# Patient Record
Sex: MALE | Race: WHITE | URBAN - METROPOLITAN AREA
[De-identification: names, ages, dates, MRNs, and addresses within clinical notes are randomized per-mention and may not be internally consistent; named-entity substitution may affect disease eponyms.]

---

## 2021-08-23 RX ORDER — CEPHALEXIN 500 MG
1 CAPSULE ORAL
Qty: 0 | Refills: 0 | DISCHARGE
Start: 2021-08-23 | End: 2021-08-29

## 2021-09-08 ENCOUNTER — INPATIENT (INPATIENT)
Facility: HOSPITAL | Age: 56
LOS: 1 days | Discharge: HOME | DRG: 281 | End: 2021-09-10
Attending: HOSPITALIST | Admitting: FAMILY MEDICINE
Payer: COMMERCIAL

## 2021-09-08 VITALS — HEIGHT: 67 IN | WEIGHT: 199.96 LBS

## 2021-09-08 DIAGNOSIS — E66.9 OBESITY, UNSPECIFIED: ICD-10-CM

## 2021-09-08 DIAGNOSIS — I34.0 NONRHEUMATIC MITRAL (VALVE) INSUFFICIENCY: ICD-10-CM

## 2021-09-08 DIAGNOSIS — N17.9 ACUTE KIDNEY FAILURE, UNSPECIFIED: ICD-10-CM

## 2021-09-08 DIAGNOSIS — I48.91 UNSPECIFIED ATRIAL FIBRILLATION: ICD-10-CM

## 2021-09-08 DIAGNOSIS — I21.A1 MYOCARDIAL INFARCTION TYPE 2: ICD-10-CM

## 2021-09-08 DIAGNOSIS — I48.0 PAROXYSMAL ATRIAL FIBRILLATION: ICD-10-CM

## 2021-09-08 DIAGNOSIS — N18.9 CHRONIC KIDNEY DISEASE, UNSPECIFIED: ICD-10-CM

## 2021-09-08 DIAGNOSIS — I12.9 HYPERTENSIVE CHRONIC KIDNEY DISEASE WITH STAGE 1 THROUGH STAGE 4 CHRONIC KIDNEY DISEASE, OR UNSPECIFIED CHRONIC KIDNEY DISEASE: ICD-10-CM

## 2021-09-08 DIAGNOSIS — E11.22 TYPE 2 DIABETES MELLITUS WITH DIABETIC CHRONIC KIDNEY DISEASE: ICD-10-CM

## 2021-09-08 LAB
A1C WITH ESTIMATED AVERAGE GLUCOSE RESULT: 9.3 % — HIGH (ref 4–5.6)
ALBUMIN SERPL ELPH-MCNC: 3.6 G/DL — SIGNIFICANT CHANGE UP (ref 3.3–5)
ALP SERPL-CCNC: 98 U/L — SIGNIFICANT CHANGE UP (ref 40–120)
ALT FLD-CCNC: 24 U/L — SIGNIFICANT CHANGE UP (ref 12–78)
ANION GAP SERPL CALC-SCNC: 7 MMOL/L — SIGNIFICANT CHANGE UP (ref 5–17)
APTT BLD: 30.1 SEC — SIGNIFICANT CHANGE UP (ref 27.5–35.5)
AST SERPL-CCNC: 15 U/L — SIGNIFICANT CHANGE UP (ref 15–37)
BASOPHILS # BLD AUTO: 0.05 K/UL — SIGNIFICANT CHANGE UP (ref 0–0.2)
BASOPHILS NFR BLD AUTO: 0.6 % — SIGNIFICANT CHANGE UP (ref 0–2)
BILIRUB SERPL-MCNC: 0.5 MG/DL — SIGNIFICANT CHANGE UP (ref 0.2–1.2)
BUN SERPL-MCNC: 54 MG/DL — HIGH (ref 7–23)
CALCIUM SERPL-MCNC: 8.9 MG/DL — SIGNIFICANT CHANGE UP (ref 8.5–10.1)
CHLORIDE SERPL-SCNC: 107 MMOL/L — SIGNIFICANT CHANGE UP (ref 96–108)
CO2 SERPL-SCNC: 25 MMOL/L — SIGNIFICANT CHANGE UP (ref 22–31)
CREAT SERPL-MCNC: 3.35 MG/DL — HIGH (ref 0.5–1.3)
EOSINOPHIL # BLD AUTO: 0.08 K/UL — SIGNIFICANT CHANGE UP (ref 0–0.5)
EOSINOPHIL NFR BLD AUTO: 1 % — SIGNIFICANT CHANGE UP (ref 0–6)
ESTIMATED AVERAGE GLUCOSE: 220 MG/DL — HIGH (ref 68–114)
GLUCOSE SERPL-MCNC: 196 MG/DL — HIGH (ref 70–99)
HCT VFR BLD CALC: 37.1 % — LOW (ref 39–50)
HGB BLD-MCNC: 12.9 G/DL — LOW (ref 13–17)
IMM GRANULOCYTES NFR BLD AUTO: 0.2 % — SIGNIFICANT CHANGE UP (ref 0–1.5)
INR BLD: 0.93 RATIO — SIGNIFICANT CHANGE UP (ref 0.88–1.16)
LYMPHOCYTES # BLD AUTO: 2.83 K/UL — SIGNIFICANT CHANGE UP (ref 1–3.3)
LYMPHOCYTES # BLD AUTO: 34.6 % — SIGNIFICANT CHANGE UP (ref 13–44)
MAGNESIUM SERPL-MCNC: 2.4 MG/DL — SIGNIFICANT CHANGE UP (ref 1.6–2.6)
MCHC RBC-ENTMCNC: 31.1 PG — SIGNIFICANT CHANGE UP (ref 27–34)
MCHC RBC-ENTMCNC: 34.8 GM/DL — SIGNIFICANT CHANGE UP (ref 32–36)
MCV RBC AUTO: 89.4 FL — SIGNIFICANT CHANGE UP (ref 80–100)
MONOCYTES # BLD AUTO: 0.69 K/UL — SIGNIFICANT CHANGE UP (ref 0–0.9)
MONOCYTES NFR BLD AUTO: 8.4 % — SIGNIFICANT CHANGE UP (ref 2–14)
NEUTROPHILS # BLD AUTO: 4.52 K/UL — SIGNIFICANT CHANGE UP (ref 1.8–7.4)
NEUTROPHILS NFR BLD AUTO: 55.2 % — SIGNIFICANT CHANGE UP (ref 43–77)
PLATELET # BLD AUTO: 192 K/UL — SIGNIFICANT CHANGE UP (ref 150–400)
POTASSIUM SERPL-MCNC: 4.1 MMOL/L — SIGNIFICANT CHANGE UP (ref 3.5–5.3)
POTASSIUM SERPL-SCNC: 4.1 MMOL/L — SIGNIFICANT CHANGE UP (ref 3.5–5.3)
PROT SERPL-MCNC: 7.2 GM/DL — SIGNIFICANT CHANGE UP (ref 6–8.3)
PROTHROM AB SERPL-ACNC: 10.9 SEC — SIGNIFICANT CHANGE UP (ref 10.6–13.6)
RBC # BLD: 4.15 M/UL — LOW (ref 4.2–5.8)
RBC # FLD: 12.1 % — SIGNIFICANT CHANGE UP (ref 10.3–14.5)
SARS-COV-2 RNA SPEC QL NAA+PROBE: SIGNIFICANT CHANGE UP
SODIUM SERPL-SCNC: 139 MMOL/L — SIGNIFICANT CHANGE UP (ref 135–145)
TSH SERPL-MCNC: 0.99 UU/ML — SIGNIFICANT CHANGE UP (ref 0.34–4.82)
WBC # BLD: 8.19 K/UL — SIGNIFICANT CHANGE UP (ref 3.8–10.5)
WBC # FLD AUTO: 8.19 K/UL — SIGNIFICANT CHANGE UP (ref 3.8–10.5)

## 2021-09-08 PROCEDURE — 83970 ASSAY OF PARATHORMONE: CPT

## 2021-09-08 PROCEDURE — 85730 THROMBOPLASTIN TIME PARTIAL: CPT

## 2021-09-08 PROCEDURE — 82962 GLUCOSE BLOOD TEST: CPT

## 2021-09-08 PROCEDURE — 76770 US EXAM ABDO BACK WALL COMP: CPT

## 2021-09-08 PROCEDURE — 84484 ASSAY OF TROPONIN QUANT: CPT

## 2021-09-08 PROCEDURE — 71045 X-RAY EXAM CHEST 1 VIEW: CPT | Mod: 26

## 2021-09-08 PROCEDURE — 82310 ASSAY OF CALCIUM: CPT

## 2021-09-08 PROCEDURE — 82570 ASSAY OF URINE CREATININE: CPT

## 2021-09-08 PROCEDURE — 81001 URINALYSIS AUTO W/SCOPE: CPT

## 2021-09-08 PROCEDURE — 99223 1ST HOSP IP/OBS HIGH 75: CPT

## 2021-09-08 PROCEDURE — 86769 SARS-COV-2 COVID-19 ANTIBODY: CPT

## 2021-09-08 PROCEDURE — 99291 CRITICAL CARE FIRST HOUR: CPT

## 2021-09-08 PROCEDURE — 83036 HEMOGLOBIN GLYCOSYLATED A1C: CPT

## 2021-09-08 PROCEDURE — 85027 COMPLETE CBC AUTOMATED: CPT

## 2021-09-08 PROCEDURE — 36415 COLL VENOUS BLD VENIPUNCTURE: CPT

## 2021-09-08 PROCEDURE — 84300 ASSAY OF URINE SODIUM: CPT

## 2021-09-08 PROCEDURE — 84156 ASSAY OF PROTEIN URINE: CPT

## 2021-09-08 PROCEDURE — 84100 ASSAY OF PHOSPHORUS: CPT

## 2021-09-08 PROCEDURE — 80053 COMPREHEN METABOLIC PANEL: CPT

## 2021-09-08 PROCEDURE — 86803 HEPATITIS C AB TEST: CPT

## 2021-09-08 PROCEDURE — 93010 ELECTROCARDIOGRAM REPORT: CPT

## 2021-09-08 PROCEDURE — 83880 ASSAY OF NATRIURETIC PEPTIDE: CPT

## 2021-09-08 PROCEDURE — 93306 TTE W/DOPPLER COMPLETE: CPT

## 2021-09-08 RX ORDER — LANOLIN ALCOHOL/MO/W.PET/CERES
3 CREAM (GRAM) TOPICAL AT BEDTIME
Refills: 0 | Status: DISCONTINUED | OUTPATIENT
Start: 2021-09-08 | End: 2021-09-10

## 2021-09-08 RX ORDER — DEXTROSE 50 % IN WATER 50 %
25 SYRINGE (ML) INTRAVENOUS ONCE
Refills: 0 | Status: DISCONTINUED | OUTPATIENT
Start: 2021-09-08 | End: 2021-09-10

## 2021-09-08 RX ORDER — HEPARIN SODIUM 5000 [USP'U]/ML
7500 INJECTION INTRAVENOUS; SUBCUTANEOUS ONCE
Refills: 0 | Status: COMPLETED | OUTPATIENT
Start: 2021-09-08 | End: 2021-09-08

## 2021-09-08 RX ORDER — GLUCAGON INJECTION, SOLUTION 0.5 MG/.1ML
1 INJECTION, SOLUTION SUBCUTANEOUS ONCE
Refills: 0 | Status: DISCONTINUED | OUTPATIENT
Start: 2021-09-08 | End: 2021-09-10

## 2021-09-08 RX ORDER — INSULIN LISPRO 100/ML
VIAL (ML) SUBCUTANEOUS
Refills: 0 | Status: DISCONTINUED | OUTPATIENT
Start: 2021-09-08 | End: 2021-09-10

## 2021-09-08 RX ORDER — DILTIAZEM HCL 120 MG
20 CAPSULE, EXT RELEASE 24 HR ORAL ONCE
Refills: 0 | Status: COMPLETED | OUTPATIENT
Start: 2021-09-08 | End: 2021-09-08

## 2021-09-08 RX ORDER — DEXTROSE 50 % IN WATER 50 %
15 SYRINGE (ML) INTRAVENOUS ONCE
Refills: 0 | Status: DISCONTINUED | OUTPATIENT
Start: 2021-09-08 | End: 2021-09-10

## 2021-09-08 RX ORDER — INSULIN LISPRO 100/ML
VIAL (ML) SUBCUTANEOUS AT BEDTIME
Refills: 0 | Status: DISCONTINUED | OUTPATIENT
Start: 2021-09-08 | End: 2021-09-10

## 2021-09-08 RX ORDER — SODIUM CHLORIDE 9 MG/ML
1000 INJECTION, SOLUTION INTRAVENOUS
Refills: 0 | Status: DISCONTINUED | OUTPATIENT
Start: 2021-09-08 | End: 2021-09-10

## 2021-09-08 RX ORDER — ONDANSETRON 8 MG/1
4 TABLET, FILM COATED ORAL EVERY 8 HOURS
Refills: 0 | Status: DISCONTINUED | OUTPATIENT
Start: 2021-09-08 | End: 2021-09-10

## 2021-09-08 RX ORDER — AMLODIPINE BESYLATE 2.5 MG/1
10 TABLET ORAL DAILY
Refills: 0 | Status: DISCONTINUED | OUTPATIENT
Start: 2021-09-08 | End: 2021-09-09

## 2021-09-08 RX ORDER — HEPARIN SODIUM 5000 [USP'U]/ML
3500 INJECTION INTRAVENOUS; SUBCUTANEOUS EVERY 6 HOURS
Refills: 0 | Status: DISCONTINUED | OUTPATIENT
Start: 2021-09-08 | End: 2021-09-09

## 2021-09-08 RX ORDER — ACETAMINOPHEN 500 MG
650 TABLET ORAL EVERY 6 HOURS
Refills: 0 | Status: DISCONTINUED | OUTPATIENT
Start: 2021-09-08 | End: 2021-09-10

## 2021-09-08 RX ORDER — DILTIAZEM HCL 120 MG
30 CAPSULE, EXT RELEASE 24 HR ORAL ONCE
Refills: 0 | Status: COMPLETED | OUTPATIENT
Start: 2021-09-08 | End: 2021-09-08

## 2021-09-08 RX ORDER — HEPARIN SODIUM 5000 [USP'U]/ML
INJECTION INTRAVENOUS; SUBCUTANEOUS
Qty: 25000 | Refills: 0 | Status: DISCONTINUED | OUTPATIENT
Start: 2021-09-08 | End: 2021-09-09

## 2021-09-08 RX ORDER — LABETALOL HCL 100 MG
400 TABLET ORAL
Refills: 0 | Status: DISCONTINUED | OUTPATIENT
Start: 2021-09-08 | End: 2021-09-10

## 2021-09-08 RX ORDER — ASPIRIN/CALCIUM CARB/MAGNESIUM 324 MG
325 TABLET ORAL ONCE
Refills: 0 | Status: COMPLETED | OUTPATIENT
Start: 2021-09-08 | End: 2021-09-08

## 2021-09-08 RX ORDER — DEXTROSE 50 % IN WATER 50 %
12.5 SYRINGE (ML) INTRAVENOUS ONCE
Refills: 0 | Status: DISCONTINUED | OUTPATIENT
Start: 2021-09-08 | End: 2021-09-10

## 2021-09-08 RX ORDER — DILTIAZEM HCL 120 MG
5 CAPSULE, EXT RELEASE 24 HR ORAL
Qty: 125 | Refills: 0 | Status: DISCONTINUED | OUTPATIENT
Start: 2021-09-08 | End: 2021-09-09

## 2021-09-08 RX ORDER — HEPARIN SODIUM 5000 [USP'U]/ML
7500 INJECTION INTRAVENOUS; SUBCUTANEOUS EVERY 6 HOURS
Refills: 0 | Status: DISCONTINUED | OUTPATIENT
Start: 2021-09-08 | End: 2021-09-09

## 2021-09-08 RX ADMIN — HEPARIN SODIUM 7500 UNIT(S): 5000 INJECTION INTRAVENOUS; SUBCUTANEOUS at 18:14

## 2021-09-08 RX ADMIN — Medication 30 MILLIGRAM(S): at 15:46

## 2021-09-08 RX ADMIN — AMLODIPINE BESYLATE 10 MILLIGRAM(S): 2.5 TABLET ORAL at 22:56

## 2021-09-08 RX ADMIN — HEPARIN SODIUM 1700 UNIT(S)/HR: 5000 INJECTION INTRAVENOUS; SUBCUTANEOUS at 18:15

## 2021-09-08 RX ADMIN — Medication 325 MILLIGRAM(S): at 18:16

## 2021-09-08 RX ADMIN — Medication 5 MG/HR: at 19:55

## 2021-09-08 RX ADMIN — Medication 1: at 22:56

## 2021-09-08 RX ADMIN — Medication 400 MILLIGRAM(S): at 22:56

## 2021-09-08 RX ADMIN — Medication 20 MILLIGRAM(S): at 15:44

## 2021-09-08 NOTE — ED ADULT NURSE REASSESSMENT NOTE - NS ED NURSE REASSESS COMMENT FT1
pt resting comfortably in bed with no acute distress noted. vss. PM medication given as per md order. Urinal provided for urine. Denies chest pain,sob, dizziness, headache. Will cont to monitor for safety and comfort.

## 2021-09-08 NOTE — H&P ADULT - HISTORY OF PRESENT ILLNESS
55 y/o M PMHx significant for hypertension, and diabetes mellitus type 2, presents to  for further evaluation and management of shortness of breath and palpitations over the past 2 days. 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 The patient denies any associated chest pain. Labs => Hgb/Hct 12.9/ 37.1, BUN/Cr 54/3.35, Glu 196, TnI => 0.094 -> 0.085. In the ED the patient was given Diltiazem 20mg IVP x 1, Diltiazem 30mg PO x 1, then started on Diltiazem gtt per protocol, Heparin gtt per protocol, and ASA 325mg PO x 1.

## 2021-09-08 NOTE — ED ADULT NURSE REASSESSMENT NOTE - NS ED NURSE REASSESS COMMENT FT1
Report taken at the change of shift at bedside. Pt awake alert and oriented x4 resting comfortably in bed with no acute distress noted. Vitals stable. Plan of care updated to pt.  Denies cp,sob,ha,dz,n/v/d/fever/chills or urinary sx. dinner provided. Will cont to monitor for safety and comfort. Report taken at the change of shift at bedside. Pt awake alert and oriented x4 resting comfortably in bed with no acute distress noted. Vitals stable. Plan of care updated to pt.  Denies cp,sob,ha,dz,n/v/d/fever/chills or urinary sx. dinner provided. Heparin infusing at 17ml/hr via pump. Will cont to monitor for safety and comfort.

## 2021-09-08 NOTE — H&P ADULT - ASSESSMENT
57 y/o M PMHx significant for hypertension, and diabetes mellitus type 2, presents to  for further evaluation and management of shortness of breath and palpitations over the past 2 days. 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 The patient denies any associated chest pain. Labs => Hgb/Hct 12.9/ 37.1, BUN/Cr 54/3.35, Glu 196, TnI => 0.094 -> 0.085. In the ED the patient was given Diltiazem 20mg IVP x 1, Diltiazem 30mg PO x 1, then started on Diltiazem gtt per protocol, Heparin gtt per protocol, and ASA 325mg PO x 1.  57 y/o M PMHx significant for hypertension, and diabetes mellitus type 2, presents to  for further evaluation and management of shortness of breath and palpitations over the past 2 days. 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 The patient denies any associated chest pain. Labs => Hgb/Hct 12.9/ 37.1, BUN/Cr 54/3.35, Glu 196, TnI => 0.094 -> 0.085. In the ED the patient was given Diltiazem 20mg IVP x 1, Diltiazem 30mg PO x 1, then started on Diltiazem gtt per protocol, Heparin gtt per protocol, and ASA 325mg PO x 1.     #New Onset Atrial Fibrillation w/ RVR  ~admit to Telemetry  ~serial Faith/EKGs  ~f/u 2DECHO in the am  ~daily weights  ~strict I/Os  ~f/u w/ Cardiology in the am  ~patient started on Heparin gtt in the ED  ~NPO after MN for possible CV in the am  ~    #Hypertension       55 y/o M PMHx significant for hypertension, and diabetes mellitus type 2, presents to  for further evaluation and management of shortness of breath and palpitations over the past 2 days. 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 The patient denies any associated chest pain. Labs => Hgb/Hct 12.9/ 37.1, BUN/Cr 54/3.35, Glu 196, TnI => 0.094 -> 0.085. In the ED the patient was given Diltiazem 20mg IVP x 1, Diltiazem 30mg PO x 1, then started on Diltiazem gtt per protocol, Heparin gtt per protocol, and ASA 325mg PO x 1.     #New Onset Atrial Fibrillation w/ RVR  ~admit to Telemetry  ~serial Faith/EKGs  ~f/u 2DECHO in the am  ~daily weights  ~strict I/Os  ~f/u w/ Cardiology in the am  ~patient started on Heparin gtt in the ED  ~NPO after MN for possible CV in the am  ~cont. Diltiazem gtt    #Hypertension  ~cont. Labetalol (takes 400mg po bid)  ~cont. Amlodipine 10mg po daily    #Diabetes Mellitus   ~FS q6h while NPO  ~cont. ISS per protocol    #Vte ppx  ~cont. Heparin gtt     57 y/o M PMHx significant for hypertension, and diabetes mellitus type 2, presents to  for further evaluation and management of shortness of breath and palpitations over the past 2 days. 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 The patient denies any associated chest pain. Labs => Hgb/Hct 12.9/ 37.1, BUN/Cr 54/3.35, Glu 196, TnI => 0.094 -> 0.085. In the ED the patient was given Diltiazem 20mg IVP x 1, Diltiazem 30mg PO x 1, then started on Diltiazem gtt per protocol, Heparin gtt per protocol, and ASA 325mg PO x 1.     #New Onset Atrial Fibrillation w/ RVR  ~admit to Telemetry  ~serial Faith/EKGs  ~f/u 2DECHO in the am  ~daily weights  ~strict I/Os  ~f/u w/ Cardiology in the am  ~patient started on Heparin gtt in the ED  ~NPO after MN for possible CV in the am  ~cont. Diltiazem gtt    #Hypertension  ~cont. Labetalol (takes 400mg po bid)  ~cont. Amlodipine 10mg po daily    #Diabetes Mellitus   ~FS q6h while NPO  ~cont. ISS per protocol    #Elevated Creatinine  ~unknown baseline  ~f/u urine studies  ~f/u w/ Nephrology  ~DDx includes progression of CKD due to underlying DM vs cardiorenal?    #Vte ppx  ~cont. Heparin gtt

## 2021-09-08 NOTE — ED PROVIDER NOTE - CLINICAL SUMMARY MEDICAL DECISION MAKING FREE TEXT BOX
55 y/o male with PMHx of HTN and DM presents to ED with new onset AFib. CHADS-VASc of 2. Will obtain labs, rate control and admit.

## 2021-09-08 NOTE — ED PROVIDER NOTE - NS ED ROS FT
Constitutional: No fever or chills  Eyes: No visual changes  HEENT: No throat pain  CV: No chest pain, +palpitations, no LE swelling   Resp: +SOB  GI: No abd pain, nausea or vomiting  : No dysuria  MSK: No musculoskeletal pain  Skin: No rash  Neuro: No headache

## 2021-09-08 NOTE — ED PROVIDER NOTE - NS_ ATTENDINGSCRIBEDETAILS _ED_A_ED_FT
I, Musa Amor MD,  performed the initial face to face bedside interview with this patient regarding history of present illness, review of symptoms and relevant past medical, social and family history.  I completed an independent physical examination.  I was the initial provider who evaluated this patient.  The history, relevant review of systems, past medical and surgical history, medical decision making, and physical examination was documented by the scribe in my presence and I attest to the accuracy of the documentation.

## 2021-09-08 NOTE — ED ADULT TRIAGE NOTE - CHIEF COMPLAINT QUOTE
SOB x 2 days. Sent by PCP for afib on EKG. + palpitations. Denies chest pain, headache, nausea, fever/chills.

## 2021-09-08 NOTE — ED PROVIDER NOTE - OBJECTIVE STATEMENT
55 y/o male with PMHx HTN and DM presents to ED c/o SOB. Pt reports that he has been experiencing SOB for the last 2 days. 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 Pt was sent to PCP Dr. Ross who found him to be in A-Fib. Pt has no hx of A-Fib. Pt denies fevers, black or bloody stool. Pt denies CP and leg swelling. Pt denies recent travel or surgeries. On arrival, pt HR found to be in the 150s-160s.

## 2021-09-08 NOTE — ED PROVIDER NOTE - PHYSICAL EXAMINATION
Constitutional: NAD AAOx3  Eyes: PERRLA EOMI  Head: Normocephalic atraumatic  Mouth: MMM  Cardiac: tachycardic, no lower extremity swelling   Resp: Lungs CTAB  GI: Abd s/nt/nd  Neuro: CN2-12 intact  Skin: No rashes

## 2021-09-08 NOTE — ED PROVIDER NOTE - WR ORDER NAME 1
pt monica from urgent care, sent over for diffuse lower back pain x 3 weeks, developed hematuria 3 days ago, no fevers, no sob, no cp. back pain causes trouble with ambulation, no bowel or bladder incontinence, no recent falls or trauma. pt on plavix
Xray Chest 1 View- PORTABLE-Urgent

## 2021-09-08 NOTE — H&P ADULT - NSHPPHYSICALEXAM_GEN_ALL_CORE
Vital Signs Last 24 Hrs  T(C): 36.8 (08 Sep 2021 18:20), Max: 36.8 (08 Sep 2021 14:19)  T(F): 98.2 (08 Sep 2021 18:20), Max: 98.2 (08 Sep 2021 14:19)  HR: 121 (08 Sep 2021 18:20) (81 - 121)  BP: 141/98 (08 Sep 2021 18:20) (120/78 - 141/98)  BP(mean): 90 (08 Sep 2021 16:00) (90 - 98)  RR: 17 (08 Sep 2021 18:20) (16 - 18)  SpO2: 98% (08 Sep 2021 18:20) (98% - 99%)

## 2021-09-09 DIAGNOSIS — I10 ESSENTIAL (PRIMARY) HYPERTENSION: ICD-10-CM

## 2021-09-09 DIAGNOSIS — N18.9 CHRONIC KIDNEY DISEASE, UNSPECIFIED: ICD-10-CM

## 2021-09-09 DIAGNOSIS — I48.0 PAROXYSMAL ATRIAL FIBRILLATION: ICD-10-CM

## 2021-09-09 LAB
A1C WITH ESTIMATED AVERAGE GLUCOSE RESULT: 9.3 % — HIGH (ref 4–5.6)
ADD ON TEST-SPECIMEN IN LAB: SIGNIFICANT CHANGE UP
ALBUMIN SERPL ELPH-MCNC: 3.2 G/DL — LOW (ref 3.3–5)
ALP SERPL-CCNC: 86 U/L — SIGNIFICANT CHANGE UP (ref 40–120)
ALT FLD-CCNC: 23 U/L — SIGNIFICANT CHANGE UP (ref 12–78)
ANION GAP SERPL CALC-SCNC: 7 MMOL/L — SIGNIFICANT CHANGE UP (ref 5–17)
APPEARANCE UR: CLEAR — SIGNIFICANT CHANGE UP
APTT BLD: 100.5 SEC — HIGH (ref 27.5–35.5)
APTT BLD: > 200 SEC (ref 27.5–35.5)
AST SERPL-CCNC: 11 U/L — LOW (ref 15–37)
BILIRUB SERPL-MCNC: 0.4 MG/DL — SIGNIFICANT CHANGE UP (ref 0.2–1.2)
BILIRUB UR-MCNC: NEGATIVE — SIGNIFICANT CHANGE UP
BUN SERPL-MCNC: 53 MG/DL — HIGH (ref 7–23)
CALCIUM SERPL-MCNC: 8.5 MG/DL — SIGNIFICANT CHANGE UP (ref 8.5–10.1)
CHLORIDE SERPL-SCNC: 107 MMOL/L — SIGNIFICANT CHANGE UP (ref 96–108)
CO2 SERPL-SCNC: 25 MMOL/L — SIGNIFICANT CHANGE UP (ref 22–31)
COLOR SPEC: SIGNIFICANT CHANGE UP
CREAT ?TM UR-MCNC: 79 MG/DL — SIGNIFICANT CHANGE UP
CREAT SERPL-MCNC: 3.13 MG/DL — HIGH (ref 0.5–1.3)
DIFF PNL FLD: NEGATIVE — SIGNIFICANT CHANGE UP
ESTIMATED AVERAGE GLUCOSE: 220 MG/DL — HIGH (ref 68–114)
GLUCOSE SERPL-MCNC: 253 MG/DL — HIGH (ref 70–99)
GLUCOSE UR QL: 1000 MG/DL
HCT VFR BLD CALC: 34.5 % — LOW (ref 39–50)
HCT VFR BLD CALC: 34.8 % — LOW (ref 39–50)
HCV AB S/CO SERPL IA: 0.12 S/CO — SIGNIFICANT CHANGE UP (ref 0–0.99)
HCV AB SERPL-IMP: SIGNIFICANT CHANGE UP
HGB BLD-MCNC: 12.2 G/DL — LOW (ref 13–17)
HGB BLD-MCNC: 12.4 G/DL — LOW (ref 13–17)
KETONES UR-MCNC: NEGATIVE — SIGNIFICANT CHANGE UP
LEUKOCYTE ESTERASE UR-ACNC: NEGATIVE — SIGNIFICANT CHANGE UP
MCHC RBC-ENTMCNC: 31.4 PG — SIGNIFICANT CHANGE UP (ref 27–34)
MCHC RBC-ENTMCNC: 31.5 PG — SIGNIFICANT CHANGE UP (ref 27–34)
MCHC RBC-ENTMCNC: 35.4 GM/DL — SIGNIFICANT CHANGE UP (ref 32–36)
MCHC RBC-ENTMCNC: 35.6 GM/DL — SIGNIFICANT CHANGE UP (ref 32–36)
MCV RBC AUTO: 88.3 FL — SIGNIFICANT CHANGE UP (ref 80–100)
MCV RBC AUTO: 88.9 FL — SIGNIFICANT CHANGE UP (ref 80–100)
NITRITE UR-MCNC: NEGATIVE — SIGNIFICANT CHANGE UP
PH UR: 5 — SIGNIFICANT CHANGE UP (ref 5–8)
PLATELET # BLD AUTO: 155 K/UL — SIGNIFICANT CHANGE UP (ref 150–400)
PLATELET # BLD AUTO: 175 K/UL — SIGNIFICANT CHANGE UP (ref 150–400)
POTASSIUM SERPL-MCNC: 3.9 MMOL/L — SIGNIFICANT CHANGE UP (ref 3.5–5.3)
POTASSIUM SERPL-SCNC: 3.9 MMOL/L — SIGNIFICANT CHANGE UP (ref 3.5–5.3)
PROT ?TM UR-MCNC: 80 MG/DL — HIGH (ref 0–12)
PROT SERPL-MCNC: 6.4 GM/DL — SIGNIFICANT CHANGE UP (ref 6–8.3)
PROT UR-MCNC: 100 MG/DL
PROT/CREAT UR-RTO: 1 RATIO — HIGH (ref 0–0.2)
RBC # BLD: 3.88 M/UL — LOW (ref 4.2–5.8)
RBC # BLD: 3.94 M/UL — LOW (ref 4.2–5.8)
RBC # FLD: 11.9 % — SIGNIFICANT CHANGE UP (ref 10.3–14.5)
RBC # FLD: 12.1 % — SIGNIFICANT CHANGE UP (ref 10.3–14.5)
SODIUM SERPL-SCNC: 139 MMOL/L — SIGNIFICANT CHANGE UP (ref 135–145)
SP GR SPEC: 1.01 — SIGNIFICANT CHANGE UP (ref 1.01–1.02)
T3 SERPL-MCNC: 77 NG/DL — LOW (ref 80–200)
T4 AB SER-ACNC: 5.7 UG/DL — SIGNIFICANT CHANGE UP (ref 4.6–12)
UROBILINOGEN FLD QL: NEGATIVE MG/DL — SIGNIFICANT CHANGE UP
WBC # BLD: 5.58 K/UL — SIGNIFICANT CHANGE UP (ref 3.8–10.5)
WBC # BLD: 7.74 K/UL — SIGNIFICANT CHANGE UP (ref 3.8–10.5)
WBC # FLD AUTO: 5.58 K/UL — SIGNIFICANT CHANGE UP (ref 3.8–10.5)
WBC # FLD AUTO: 7.74 K/UL — SIGNIFICANT CHANGE UP (ref 3.8–10.5)

## 2021-09-09 PROCEDURE — 99223 1ST HOSP IP/OBS HIGH 75: CPT

## 2021-09-09 PROCEDURE — 93306 TTE W/DOPPLER COMPLETE: CPT | Mod: 26

## 2021-09-09 PROCEDURE — 99232 SBSQ HOSP IP/OBS MODERATE 35: CPT

## 2021-09-09 RX ORDER — INSULIN GLARGINE 100 [IU]/ML
10 INJECTION, SOLUTION SUBCUTANEOUS AT BEDTIME
Refills: 0 | Status: DISCONTINUED | OUTPATIENT
Start: 2021-09-09 | End: 2021-09-10

## 2021-09-09 RX ORDER — HEPARIN SODIUM 5000 [USP'U]/ML
1400 INJECTION INTRAVENOUS; SUBCUTANEOUS
Qty: 25000 | Refills: 0 | Status: DISCONTINUED | OUTPATIENT
Start: 2021-09-09 | End: 2021-09-09

## 2021-09-09 RX ORDER — DILTIAZEM HCL 120 MG
180 CAPSULE, EXT RELEASE 24 HR ORAL DAILY
Refills: 0 | Status: DISCONTINUED | OUTPATIENT
Start: 2021-09-09 | End: 2021-09-10

## 2021-09-09 RX ORDER — DILTIAZEM HCL 120 MG
30 CAPSULE, EXT RELEASE 24 HR ORAL
Refills: 0 | Status: DISCONTINUED | OUTPATIENT
Start: 2021-09-09 | End: 2021-09-09

## 2021-09-09 RX ORDER — APIXABAN 2.5 MG/1
5 TABLET, FILM COATED ORAL
Refills: 0 | Status: DISCONTINUED | OUTPATIENT
Start: 2021-09-02 | End: 2021-09-10

## 2021-09-09 RX ADMIN — Medication 3: at 12:27

## 2021-09-09 RX ADMIN — Medication 1: at 08:20

## 2021-09-09 RX ADMIN — Medication 2: at 17:37

## 2021-09-09 RX ADMIN — HEPARIN SODIUM 1400 UNIT(S)/HR: 5000 INJECTION INTRAVENOUS; SUBCUTANEOUS at 03:24

## 2021-09-09 RX ADMIN — INSULIN GLARGINE 10 UNIT(S): 100 INJECTION, SOLUTION SUBCUTANEOUS at 21:27

## 2021-09-09 RX ADMIN — Medication 400 MILLIGRAM(S): at 21:27

## 2021-09-09 RX ADMIN — Medication 180 MILLIGRAM(S): at 09:31

## 2021-09-09 RX ADMIN — APIXABAN 5 MILLIGRAM(S): 2.5 TABLET, FILM COATED ORAL at 21:27

## 2021-09-09 RX ADMIN — HEPARIN SODIUM 1200 UNIT(S)/HR: 5000 INJECTION INTRAVENOUS; SUBCUTANEOUS at 11:17

## 2021-09-09 RX ADMIN — Medication 400 MILLIGRAM(S): at 09:31

## 2021-09-09 NOTE — CONSULT NOTE ADULT - SUBJECTIVE AND OBJECTIVE BOX
57 y/o M PMHx significant for hypertension, and diabetes mellitus type 2, presents to  for further evaluation and management of shortness of breath and palpitations over the past 2 days. 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     Renal eval called for elevated creatinine - pt seen by Dr Zuniga in past for CKD but has not followed up in office > 1 year . last Creatinine was 2 's in    has not seen his PCP in > 1 year    today feeling ok    want to go home         PAST MEDICAL & SURGICAL HISTORY:  Hypertension    DM (diabetes mellitus)  Type 2    No significant past surgical history    Home Medications:  amLODIPine 10 mg oral tablet: 1 tab(s) orally once a day (08 Sep 2021 17:11)  Bydureon Pen 2 mg subcutaneous injection, extended release: 2 milligram(s) subcutaneously once a week  ***Pt hasn&#x27;t used in 1 week- ran out*** (08 Sep 2021 17:11)  cephalexin 500 mg oral capsule: 1 cap(s) orally 2 times a day  ***Course Completed*** (08 Sep 2021 17:11)  Jardiance 25 mg oral tablet: 1 tab(s) orally once a day (in the morning) (08 Sep 2021 17:11)  labetalol 200 mg oral tablet: 2 tab(s) orally 2 times a day (08 Sep 2021 17:11)  [employer] COVID-19 Vaccine PF 30 mcg/0.3 mL intramuscular suspension: 0.3 milliliter(s) intramuscular once  ***2nd dose in April*** (08 Sep 2021 17:11)      MEDICATIONS  (STANDING):  apixaban 5 milliGRAM(s) Oral two times a day  dextrose 40% Gel 15 Gram(s) Oral once  dextrose 5%. 1000 milliLiter(s) (50 mL/Hr) IV Continuous <Continuous>  dextrose 5%. 1000 milliLiter(s) (100 mL/Hr) IV Continuous <Continuous>  dextrose 50% Injectable 25 Gram(s) IV Push once  dextrose 50% Injectable 12.5 Gram(s) IV Push once  dextrose 50% Injectable 25 Gram(s) IV Push once  diltiazem    milliGRAM(s) Oral daily  glucagon  Injectable 1 milliGRAM(s) IntraMuscular once  insulin glargine Injectable (LANTUS) 10 Unit(s) SubCutaneous at bedtime  insulin lispro (ADMELOG) corrective regimen sliding scale   SubCutaneous three times a day before meals  insulin lispro (ADMELOG) corrective regimen sliding scale   SubCutaneous at bedtime  labetalol 400 milliGRAM(s) Oral two times a day      Allergies    No Known Allergies    Intolerances        SOCIAL HISTORY:  Denies ETOh,Smoking,     FAMILY HISTORY:  No pertinent family history in first degree relatives        REVIEW OF SYSTEMS:    CONSTITUTIONAL: No weakness, fevers or chills  EYES/ENT: No visual changes;  No vertigo or throat pain   NECK: No pain or stiffness  RESPIRATORY: No cough, wheezing, hemoptysis; No shortness of breath  CARDIOVASCULAR: No chest pain or palpitations  GASTROINTESTINAL: No abdominal or epigastric pain. No nausea, vomiting, or hematemesis; No diarrhea or constipation. No melena or hematochezia.  GENITOURINARY: No dysuria, frequency or hematuria  NEUROLOGICAL: No numbness or weakness  SKIN: No itching, burning, rashes, or lesions   All other review of systems is negative unless indicated above.    VITAL:  T(C): , Max: 36.7 (21 @ 00:24)  T(F): , Max: 98 (21 @ 00:24)  HR: 69 (21 @ 16:23)  BP: 138/78 (21 @ 16:23)  BP(mean): --  RR: 19 (21 @ 16:23)  SpO2: 99% (21 @ 16:23)  Wt(kg): --    I and O's:        PHYSICAL EXAM:    Constitutional: NAD  HEENT: PERRLA, EOMI,  MMM  Neck: No LAD, No JVD  Respiratory: CTAB  Cardiovascular: S1 and S2  Gastrointestinal: BS+, soft, NT/ND  Extremities: No peripheral edema  Neurological: A/O x 3, no focal deficits  : No Nieves  Skin: No rashes  Access: Not applicable    LABS:                        12.4   5.58  )-----------( 155      ( 09 Sep 2021 09:33 )             34.8       139    |  107    |  53     ----------------------------<  253       09 Sep 2021 09:35  3.9     |  25     |  3.13     139    |  107    |  54     ----------------------------<  196       08 Sep 2021 15:36  4.1     |  25     |  3.35     Ca    8.5        09 Sep 2021 09:35  Ca    8.9        08 Sep 2021 15:36    Phos  3.7       09 Sep 2021 09:35    Mg     2.4       08 Sep 2021 15:36    TPro  6.4    /  Alb  3.2    /  TBili  0.4    /        09 Sep 2021 09:35  DBili  x      /  AST  11     /  ALT  23     /  AlkPhos  86       TPro  7.2    /  Alb  3.6    /  TBili  0.5    /        08 Sep 2021 15:36  DBili  x      /  AST  15     /  ALT  24     /  AlkPhos  98         Ca    8.5      09 Sep 2021 09:35  Phos  3.7     -  Mg     2.4     -    TPro  6.4  /  Alb  3.2<L>  /  TBili  0.4  /  DBili  x   /  AST  11<L>  /  ALT  23  /  AlkPhos  86        Urine Studies:  Urinalysis Basic - ( 09 Sep 2021 17:05 )    Color: Pale Yellow / Appearance: Clear / S.015 / pH: x  Gluc: x / Ketone: Negative  / Bili: Negative / Urobili: Negative mg/dL   Blood: x / Protein: 100 mg/dL / Nitrite: Negative   Leuk Esterase: Negative / RBC: Negative /HPF / WBC Negative   Sq Epi: x / Non Sq Epi: Occasional / Bacteria: Moderate      Protein/Creatinine Ratio Calculation: 1.0 Ratio ( @ 17:05)  Creatinine, Random Urine: 79.0 mg/dL ( @ 17:05)    RADIOLOGY & ADDITIONAL STUDIES:                      
  CHIEF COMPLAINT: Patient is a 56y old  Male who presents with a chief complaint of Shortness of Breath, Palpitations (08 Sep 2021 19:24)    HPI:  55 y/o man with a history of HTN, DM, CKD, obesity, who presented to the ER for the evaluation and treatment of new onset atrial fibrillation.  He started to experience dyspnea, palpitations, and decreased exertional status on the day of presentation while at work --found to be tachycardic and in new-onset atrial fibrillation after seeing his PCP (Dr. Ross).  He denies any associated chest discomfort and has no past history of heart disease.  He was treated with IV heparin and IV diltiazem in the ED and eventually had a recurrence of sinus rhythm. His main complaint this morning is fatigue -- says he is frustrated that he has been unable to get any sleep in the past 24 hours.    There is no history of MARIELA or snoring.    PAST MEDICAL & SURGICAL HISTORY:  Hypertension  DM (diabetes mellitus) Type 2  No significant past surgical history    SOCIAL HISTORY:   Alcohol: Rare use  Smoking: Nonsmoker  Marital Status:     FAMILY HISTORY: No pertinent family history in first degree relatives    MEDICATIONS  (STANDING):  amLODIPine   Tablet 10 milliGRAM(s) Oral daily  diltiazem    Tablet 30 milliGRAM(s) Oral four times a day  diltiazem Infusion 5 mG/Hr (5 mL/Hr) IV Continuous <Continuous>  glucagon  Injectable 1 milliGRAM(s) IntraMuscular once  heparin  Infusion. 1400 Unit(s)/Hr (14 mL/Hr) IV Continuous <Continuous>  insulin lispro (ADMELOG) corrective regimen sliding scale   SubCutaneous three times a day before meals  insulin lispro (ADMELOG) corrective regimen sliding scale   SubCutaneous at bedtime  labetalol 400 milliGRAM(s) Oral two times a day    MEDICATIONS  (PRN):  acetaminophen   Tablet .. 650 milliGRAM(s) Oral every 6 hours PRN Temp greater or equal to 38.5C (101.3F), Mild Pain (1 - 3)  aluminum hydroxide/magnesium hydroxide/simethicone Suspension 30 milliLiter(s) Oral every 4 hours PRN Dyspepsia  heparin   Injectable 3500 Unit(s) IV Push every 6 hours PRN For aPTT between 40 - 57  heparin   Injectable 7500 Unit(s) IV Push every 6 hours PRN For aPTT less than 40  melatonin 3 milliGRAM(s) Oral at bedtime PRN Insomnia  ondansetron Injectable 4 milliGRAM(s) IV Push every 8 hours PRN Nausea and/or Vomiting    Allergies: No Known Allergies    REVIEW OF SYSTEMS:  CONSTITUTIONAL: + fatigue, no fevers or chills  Eyes: No visual changes  NECK: No pain or stiffness  RESPIRATORY: No cough, wheezing, hemoptysis; + shortness of breath  CARDIOVASCULAR: No chest pain, + palpitations  GASTROINTESTINAL: No abdominal pain. No nausea, vomiting, or hematemesis; No diarrhea or constipation. No melena or hematochezia.  GENITOURINARY: No dysuria, frequency or hematuria  NEUROLOGICAL: No numbness.  SKIN: No itching or rash  PSYCH: + Under stress  All other review of systems is negative unless indicated above    VITAL SIGNS:   Vital Signs Last 24 Hrs  T(C): 36 (09 Sep 2021 03:15), Max: 36.8 (08 Sep 2021 14:19)  T(F): 96.8 (09 Sep 2021 03:15), Max: 98.2 (08 Sep 2021 14:19)  HR: 69 (09 Sep 2021 06:00) (69 - 139)  BP: 98/59 (09 Sep 2021 06:00) (98/59 - 141/98)  BP(mean): 90 (08 Sep 2021 16:00) (90 - 98)  RR: 18 (09 Sep 2021 03:15) (16 - 20)  SpO2: 98% (09 Sep 2021 03:15) (98% - 99%)    PHYSICAL EXAM:  Constitutional: NAD, awake and alert  HEENT:  EOMI,  Pupils round, No oral cyanosis.  Pulmonary: Non-labored, breath sounds are clear bilaterally  Cardiovascular: S1 and S2, regular rate and rhythm  Gastrointestinal: Bowel Sounds present, soft, nontender.   Lymph: No edema. No cervical lymphadenopathy.  Neurological: Alert, no focal deficits  Skin: No rashes.  Psych:  Mood & affect appropriate, anxious    LABS:                      12.2   7.74  )-----------( 175      ( 09 Sep 2021 01:28 )             34.5              139    |  107    |  54     ----------------------------<  196    4.1     |  25     |  3.35     Ca    8.9        08 Sep 2021 15:36  Mg     2.4       08 Sep 2021 15:36    TPro  7.2    /  Alb  3.6    /  TBili  0.5    /  DBili  x      /  AST  15     /  ALT  24     /  AlkPhos  98     08 Sep 2021 15:36    PT/INR - ( 08 Sep 2021 15:36 )   PT: 10.9 sec;   INR: 0.93 ratio    PTT - ( 09 Sep 2021 01:28 )  PTT:> 200 sec    CARDIAC MARKERS ( 08 Sep 2021 18:09 ) 0.085 ng/mL / x     / x     / x     / x      CARDIAC MARKERS ( 08 Sep 2021 15:36 ) 0.094 ng/mL / x     / x     / x     / x        Pro Bnp [id number]  TSH: 0.99    Tele:  SR    Admission ECG:  AF with vent rate ~ 150, nonspecific T wave abnormality

## 2021-09-09 NOTE — CONSULT NOTE ADULT - PROBLEM SELECTOR RECOMMENDATION 9
Highly symptomatic atrial fibrillation upon presentation and with spontaneous conversion to sinus rhythm.  D/c IV diltiazem (start oral route of administration); change IV heparin to Eliquis (CHADS-VASC = 2) after echo (would like to ensure LV function is normal).  Echo today.  Outpatient stress test if normal LVEF.

## 2021-09-09 NOTE — CONSULT NOTE ADULT - PROBLEM SELECTOR RECOMMENDATION 2
Mr Colunga has a history of HTN and was taking amlodipine and labetalol prior to admission; change amlodipine for cardizem to blunt tachycardia in the event his AF recurs.

## 2021-09-09 NOTE — ED ADULT NURSE REASSESSMENT NOTE - NS ED NURSE REASSESS COMMENT FT1
heparin gtt needs to be re-ordered to reflect new drug dosing weight. covering physician dr germain notified, will address re-ordering gtt at 1400 units/hour shortly when computer available to review chart.

## 2021-09-09 NOTE — CONSULT NOTE ADULT - ASSESSMENT
57 yo male with HTN, DM and CKD Cr ~ 2 in 2020 has not followed up w Dr bourne since then. Now presenting with Afib RVR w Cr in 3's     RUDY on CKD vs CKD progression with latter more likley    check urine studies and renal sono for completeness   no ACE or ARB for now    advised pt on importance of compliance and convern for CKD progression    pt agreeable to stay for above workup    Dr Bourne to resume care of pt tomorrow     HTN    SBP 90 this AM  - avoid SBp < 120 in pt w hx of uncontrolled HTN   repeat BP now OK     ** pt seen earlier today     d/w Dr Hartman   d/w Dr Rubin

## 2021-09-10 ENCOUNTER — TRANSCRIPTION ENCOUNTER (OUTPATIENT)
Age: 56
End: 2021-09-10

## 2021-09-10 VITALS
TEMPERATURE: 97 F | SYSTOLIC BLOOD PRESSURE: 143 MMHG | RESPIRATION RATE: 18 BRPM | HEART RATE: 75 BPM | DIASTOLIC BLOOD PRESSURE: 80 MMHG | OXYGEN SATURATION: 98 %

## 2021-09-10 LAB
ALBUMIN SERPL ELPH-MCNC: 3.4 G/DL — SIGNIFICANT CHANGE UP (ref 3.3–5)
ALP SERPL-CCNC: 99 U/L — SIGNIFICANT CHANGE UP (ref 40–120)
ALT FLD-CCNC: 23 U/L — SIGNIFICANT CHANGE UP (ref 12–78)
ANION GAP SERPL CALC-SCNC: 6 MMOL/L — SIGNIFICANT CHANGE UP (ref 5–17)
AST SERPL-CCNC: 13 U/L — LOW (ref 15–37)
BILIRUB SERPL-MCNC: 0.3 MG/DL — SIGNIFICANT CHANGE UP (ref 0.2–1.2)
BUN SERPL-MCNC: 47 MG/DL — HIGH (ref 7–23)
CALCIUM SERPL-MCNC: 8.8 MG/DL — SIGNIFICANT CHANGE UP (ref 8.5–10.1)
CALCIUM SERPL-MCNC: 9 MG/DL — SIGNIFICANT CHANGE UP (ref 8.4–10.5)
CHLORIDE SERPL-SCNC: 111 MMOL/L — HIGH (ref 96–108)
CO2 SERPL-SCNC: 25 MMOL/L — SIGNIFICANT CHANGE UP (ref 22–31)
COVID-19 SPIKE DOMAIN AB INTERP: POSITIVE
COVID-19 SPIKE DOMAIN ANTIBODY RESULT: >250 U/ML — HIGH
CREAT SERPL-MCNC: 3.01 MG/DL — HIGH (ref 0.5–1.3)
GLUCOSE BLDC GLUCOMTR-MCNC: 216 MG/DL — HIGH (ref 70–99)
GLUCOSE SERPL-MCNC: 173 MG/DL — HIGH (ref 70–99)
HCT VFR BLD CALC: 36.6 % — LOW (ref 39–50)
HGB BLD-MCNC: 12.9 G/DL — LOW (ref 13–17)
MCHC RBC-ENTMCNC: 31.2 PG — SIGNIFICANT CHANGE UP (ref 27–34)
MCHC RBC-ENTMCNC: 35.2 GM/DL — SIGNIFICANT CHANGE UP (ref 32–36)
MCV RBC AUTO: 88.6 FL — SIGNIFICANT CHANGE UP (ref 80–100)
PLATELET # BLD AUTO: 171 K/UL — SIGNIFICANT CHANGE UP (ref 150–400)
POTASSIUM SERPL-MCNC: 4.3 MMOL/L — SIGNIFICANT CHANGE UP (ref 3.5–5.3)
POTASSIUM SERPL-SCNC: 4.3 MMOL/L — SIGNIFICANT CHANGE UP (ref 3.5–5.3)
PROT SERPL-MCNC: 6.8 GM/DL — SIGNIFICANT CHANGE UP (ref 6–8.3)
PTH-INTACT FLD-MCNC: 135 PG/ML — HIGH (ref 15–65)
RBC # BLD: 4.13 M/UL — LOW (ref 4.2–5.8)
RBC # FLD: 12 % — SIGNIFICANT CHANGE UP (ref 10.3–14.5)
SARS-COV-2 IGG+IGM SERPL QL IA: >250 U/ML — HIGH
SARS-COV-2 IGG+IGM SERPL QL IA: POSITIVE
SODIUM SERPL-SCNC: 142 MMOL/L — SIGNIFICANT CHANGE UP (ref 135–145)
SODIUM UR-SCNC: 24 MMOL/L — SIGNIFICANT CHANGE UP
WBC # BLD: 6.17 K/UL — SIGNIFICANT CHANGE UP (ref 3.8–10.5)
WBC # FLD AUTO: 6.17 K/UL — SIGNIFICANT CHANGE UP (ref 3.8–10.5)

## 2021-09-10 PROCEDURE — 99239 HOSP IP/OBS DSCHRG MGMT >30: CPT

## 2021-09-10 PROCEDURE — 99233 SBSQ HOSP IP/OBS HIGH 50: CPT

## 2021-09-10 PROCEDURE — 76770 US EXAM ABDO BACK WALL COMP: CPT | Mod: 26

## 2021-09-10 RX ORDER — AMLODIPINE BESYLATE 2.5 MG/1
1 TABLET ORAL
Qty: 0 | Refills: 0 | DISCHARGE

## 2021-09-10 RX ORDER — EXENATIDE 250 UG/ML
2 INJECTION SUBCUTANEOUS
Qty: 4 | Refills: 0
Start: 2021-09-10 | End: 2021-10-09

## 2021-09-10 RX ORDER — DILTIAZEM HCL 120 MG
1 CAPSULE, EXT RELEASE 24 HR ORAL
Qty: 30 | Refills: 0
Start: 2021-09-10 | End: 2021-10-09

## 2021-09-10 RX ORDER — EXENATIDE 250 UG/ML
2 INJECTION SUBCUTANEOUS
Qty: 0 | Refills: 0 | DISCHARGE

## 2021-09-10 RX ORDER — APIXABAN 2.5 MG/1
1 TABLET, FILM COATED ORAL
Qty: 60 | Refills: 0
Start: 2021-09-10 | End: 2021-10-09

## 2021-09-10 RX ADMIN — Medication 180 MILLIGRAM(S): at 09:26

## 2021-09-10 RX ADMIN — APIXABAN 5 MILLIGRAM(S): 2.5 TABLET, FILM COATED ORAL at 09:26

## 2021-09-10 RX ADMIN — Medication 2: at 12:02

## 2021-09-10 RX ADMIN — Medication 1: at 09:02

## 2021-09-10 RX ADMIN — Medication 400 MILLIGRAM(S): at 09:26

## 2021-09-10 NOTE — DISCHARGE NOTE NURSING/CASE MANAGEMENT/SOCIAL WORK - PATIENT PORTAL LINK FT
You can access the FollowMyHealth Patient Portal offered by [facility] by registering at the following website: [email or web address]. By joining [employer]’s FollowMyHealth portal, you will also be able to view your health information using other applications (apps) compatible with our system.

## 2021-09-10 NOTE — DISCHARGE NOTE PROVIDER - NSDCMRMEDTOKEN_GEN_ALL_CORE_FT
apixaban 5 mg oral tablet: 1 tab(s) orally 2 times a day  Bydureon Pen 2 mg subcutaneous injection, extended release: 2 milligram(s) subcutaneously once a week  ***Pt hasn&#x27;t used in 1 week- ran out***  dilTIAZem 180 mg/24 hours oral capsule, extended release: 1 cap(s) orally once a day  Jardiance 25 mg oral tablet: 1 tab(s) orally once a day (in the morning)  labetalol 200 mg oral tablet: 2 tab(s) orally 2 times a day  [employer] COVID-19 Vaccine PF 30 mcg/0.3 mL intramuscular suspension: 0.3 milliliter(s) intramuscular once  ***2nd dose in April***

## 2021-09-10 NOTE — DISCHARGE NOTE PROVIDER - CARE PROVIDER_API CALL
Miller Ross)  Internal Medicine  [address], [address]  [place] [address]  Phone: [phone]  Fax: [phone]  Follow Up Time: 2 weeks    Joselito Zuniga  INTERNAL MEDICINE  [address] [place] [address]  Phone: [phone]  Fax: [phone]  Follow Up Time: 2 weeks    Junior Rubin)  Cardiovascular Disease; Internal Medicine  [address], [address]  [place] [address]  Phone: [phone]  Fax: [phone]  Follow Up Time: 1 week    Ariela Ortez)  EndocrinologyMetabDiabetes; Internal Medicine  [address] [place] [address]  Phone: [phone]  Fax: [phone]  Follow Up Time: 1 week

## 2021-09-10 NOTE — PROGRESS NOTE ADULT - SUBJECTIVE AND OBJECTIVE BOX
57 y/o M PMHx significant for hypertension, and diabetes mellitus type 2, presents to  for further evaluation and management of shortness of breath and palpitations over the past 2 days. 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       Patient seen and examined. No chest pain or sob this morning. Back in sinus, no palpitations.      Review of Systems: Negative except for above      Vital Signs Last 24 Hrs  T(C): 36 (09 Sep 2021 03:15), Max: 36.8 (08 Sep 2021 18:20)  T(F): 96.8 (09 Sep 2021 03:15), Max: 98.2 (08 Sep 2021 18:20)  HR: 69 (09 Sep 2021 06:00) (69 - 139)  BP: 98/59 (09 Sep 2021 06:00) (98/59 - 141/98)  BP(mean): 90 (08 Sep 2021 16:00) (90 - 90)  RR: 18 (09 Sep 2021 03:15) (16 - 20)  SpO2: 98% (09 Sep 2021 03:15) (98% - 99%)      PHYSICAL EXAM:  Constitutional: NAD, awake and alert, well-developed  HEENT: PERR, EOMI, Normal Hearing, MMM  Neck: Soft and supple, No LAD, No JVD  Respiratory: Breath sounds are clear bilaterally, No wheezing, rales or rhonchi  Cardiovascular: S1 and S2, regular rate and rhythm, no Murmurs, gallops or rubs  Gastrointestinal: Bowel Sounds present, soft, nontender, nondistended, no guarding, no rebound  Extremities: No peripheral edema  Vascular: 2+ peripheral pulses  Neurological: A/O x 3, no focal deficits  Musculoskeletal: 5/5 strength b/l upper and lower extremities  Skin: No rashes      LABS: All Labs Reviewed:                        12.4   5.58  )-----------( 155      ( 09 Sep 2021 09:33 )             34.8     09-09    139  |  107  |  53<H>  ----------------------------<  253<H>  3.9   |  25  |  3.13<H>    Ca    8.5      09 Sep 2021 09:35  Phos  3.7     09-09  Mg     2.4     09-08    TPro  6.4  /  Alb  3.2<L>  /  TBili  0.4  /  DBili  x   /  AST  11<L>  /  ALT  23  /  AlkPhos  86  09-09    PT/INR - ( 08 Sep 2021 15:36 )   PT: 10.9 sec;   INR: 0.93 ratio         PTT - ( 09 Sep 2021 09:35 )  PTT:100.5 sec  CARDIAC MARKERS ( 08 Sep 2021 18:09 )  0.085 ng/mL / x     / x     / x     / x      CARDIAC MARKERS ( 08 Sep 2021 15:36 )  0.094 ng/mL / x     / x     / x     / x             Chest 1 View- PORTABLE-Urgent (Xray Chest 1 View- PORTABLE-Urgent .) (09.08.21 @ 16:35) >    IMPRESSION:   No radiographic evidence of active chest disease.    
Patient is a 56y Male who reports no complaints overnight. Eager to leave, reports good uop.      MEDICATIONS  (STANDING):  apixaban 5 milliGRAM(s) Oral two times a day  dextrose 40% Gel 15 Gram(s) Oral once  dextrose 5%. 1000 milliLiter(s) (50 mL/Hr) IV Continuous <Continuous>  dextrose 5%. 1000 milliLiter(s) (100 mL/Hr) IV Continuous <Continuous>  dextrose 50% Injectable 25 Gram(s) IV Push once  dextrose 50% Injectable 12.5 Gram(s) IV Push once  dextrose 50% Injectable 25 Gram(s) IV Push once  diltiazem    milliGRAM(s) Oral daily  glucagon  Injectable 1 milliGRAM(s) IntraMuscular once  insulin glargine Injectable (LANTUS) 10 Unit(s) SubCutaneous at bedtime  insulin lispro (ADMELOG) corrective regimen sliding scale   SubCutaneous three times a day before meals  insulin lispro (ADMELOG) corrective regimen sliding scale   SubCutaneous at bedtime  labetalol 400 milliGRAM(s) Oral two times a day    MEDICATIONS  (PRN):  acetaminophen   Tablet .. 650 milliGRAM(s) Oral every 6 hours PRN Temp greater or equal to 38.5C (101.3F), Mild Pain (1 - 3)  aluminum hydroxide/magnesium hydroxide/simethicone Suspension 30 milliLiter(s) Oral every 4 hours PRN Dyspepsia  melatonin 3 milliGRAM(s) Oral at bedtime PRN Insomnia  ondansetron Injectable 4 milliGRAM(s) IV Push every 8 hours PRN Nausea and/or Vomiting        T(C): , Max: 36.1 (09-10-21 @ 09:13)  T(F): , Max: 96.9 (09-10-21 @ 09:13)  HR: 75 (09-10-21 @ 09:13)  BP: 143/80 (09-10-21 @ 09:13)  BP(mean): --  RR: 18 (09-10-21 @ 09:13)  SpO2: 98% (09-10-21 @ 09:13)  Wt(kg): --        PHYSICAL EXAM:    Constitutional: NAD, obese  HEENT: PERRLA, EOMI,  MMM  Neck: No LAD, No JVD  Respiratory: CTAB  Cardiovascular: S1 and S2  Gastrointestinal: BS+, soft, NT/ND  Extremities: No peripheral edema  Neurological: A/O x 3, no focal deficits        LABS:                        12.9   6.17  )-----------( 171      ( 10 Sep 2021 08:49 )             36.6     10 Sep 2021 08:49    142    |  111    |  47     ----------------------------<  173    4.3     |  25     |  3.01   09 Sep 2021 09:35    139    |  107    |  53     ----------------------------<  253    3.9     |  25     |  3.13   08 Sep 2021 15:36    139    |  107    |  54     ----------------------------<  196    4.1     |  25     |  3.35     Ca    8.8        10 Sep 2021 08:49  Ca    8.5        09 Sep 2021 09:35  Ca    8.9        08 Sep 2021 15:36  Phos  3.3       10 Sep 2021 08:49  Phos  3.7       09 Sep 2021 09:35  Mg     2.4       08 Sep 2021 15:36    TPro  6.8    /  Alb  3.4    /  TBili  0.3    /  DBili  x      /  AST  13     /  ALT  23     /  AlkPhos  99     10 Sep 2021 08:49  TPro  6.4    /  Alb  3.2    /  TBili  0.4    /  DBili  x      /  AST  11     /  ALT  23     /  AlkPhos  86     09 Sep 2021 09:35  TPro  7.2    /  Alb  3.6    /  TBili  0.5    /  DBili  x      /  AST  15     /  ALT  24     /  AlkPhos  98     08 Sep 2021 15:36          Urine Studies:  Urinalysis Basic - ( 09 Sep 2021 17:05 )    Color: Pale Yellow / Appearance: Clear / S.015 / pH: x  Gluc: x / Ketone: Negative  / Bili: Negative / Urobili: Negative mg/dL   Blood: x / Protein: 100 mg/dL / Nitrite: Negative   Leuk Esterase: Negative / RBC: Negative /HPF / WBC Negative   Sq Epi: x / Non Sq Epi: Occasional / Bacteria: Moderate      Sodium, Random Urine: 24 mmol/L ( @ 17:05)  Protein/Creatinine Ratio Calculation: 1.0 Ratio ( @ 17:05)  Creatinine, Random Urine: 79.0 mg/dL ( @ 17:05)        RADIOLOGY & ADDITIONAL STUDIES:              
  CHIEF COMPLAINT: Patient is a 56y old  Male who presents with a chief complaint of Shortness of Breath, Palpitations (08 Sep 2021 19:24)    HPI:  55 y/o man with a history of HTN, DM, CKD, obesity, who presented to the ER for the evaluation and treatment of new onset atrial fibrillation.  He started to experience dyspnea, palpitations, and decreased exertional status on the day of presentation while at work --found to be tachycardic and in new-onset atrial fibrillation after seeing his PCP (Dr. Ross).  He denies any associated chest discomfort and has no past history of heart disease.  He was treated with IV heparin and IV diltiazem in the ED and eventually had a recurrence of sinus rhythm. His main complaint this morning is fatigue -- says he is frustrated that he has been unable to get any sleep in the past 24 hours.    There is no history of MARIELA or snoring.    9/910/21 Patient is feeling fine , remain in sinus rhythm     PAST MEDICAL & SURGICAL HISTORY:  Hypertension  DM (diabetes mellitus) Type 2  No significant past surgical history    SOCIAL HISTORY:   Alcohol: Rare use  Smoking: Nonsmoker  Marital Status:     FAMILY HISTORY: No pertinent family history in first degree relatives    MEDICATIONS  (STANDING):  amLODIPine   Tablet 10 milliGRAM(s) Oral daily  diltiazem    Tablet 30 milliGRAM(s) Oral four times a day  diltiazem Infusion 5 mG/Hr (5 mL/Hr) IV Continuous <Continuous>  glucagon  Injectable 1 milliGRAM(s) IntraMuscular once  heparin  Infusion. 1400 Unit(s)/Hr (14 mL/Hr) IV Continuous <Continuous>  insulin lispro (ADMELOG) corrective regimen sliding scale   SubCutaneous three times a day before meals  insulin lispro (ADMELOG) corrective regimen sliding scale   SubCutaneous at bedtime  labetalol 400 milliGRAM(s) Oral two times a day    MEDICATIONS  (PRN):  acetaminophen   Tablet .. 650 milliGRAM(s) Oral every 6 hours PRN Temp greater or equal to 38.5C (101.3F), Mild Pain (1 - 3)  aluminum hydroxide/magnesium hydroxide/simethicone Suspension 30 milliLiter(s) Oral every 4 hours PRN Dyspepsia  heparin   Injectable 3500 Unit(s) IV Push every 6 hours PRN For aPTT between 40 - 57  heparin   Injectable 7500 Unit(s) IV Push every 6 hours PRN For aPTT less than 40  melatonin 3 milliGRAM(s) Oral at bedtime PRN Insomnia  ondansetron Injectable 4 milliGRAM(s) IV Push every 8 hours PRN Nausea and/or Vomiting    Allergies: No Known Allergies    REVIEW OF SYSTEMS:  CONSTITUTIONAL: + fatigue, no fevers or chills  Eyes: No visual changes  NECK: No pain or stiffness  RESPIRATORY: No cough, wheezing, hemoptysis; + shortness of breath  CARDIOVASCULAR: No chest pain, + palpitations  GASTROINTESTINAL: No abdominal pain. No nausea, vomiting, or hematemesis; No diarrhea or constipation. No melena or hematochezia.  GENITOURINARY: No dysuria, frequency or hematuria  NEUROLOGICAL: No numbness.  SKIN: No itching or rash  PSYCH: + Under stress  All other review of systems is negative unless indicated above    VITAL SIGNS:   Vital Signs Last 24 Hrs  T(C): 36 (09 Sep 2021 03:15), Max: 36.8 (08 Sep 2021 14:19)  T(F): 96.8 (09 Sep 2021 03:15), Max: 98.2 (08 Sep 2021 14:19)  HR: 69 (09 Sep 2021 06:00) (69 - 139)  BP: 98/59 (09 Sep 2021 06:00) (98/59 - 141/98)  BP(mean): 90 (08 Sep 2021 16:00) (90 - 98)  RR: 18 (09 Sep 2021 03:15) (16 - 20)  SpO2: 98% (09 Sep 2021 03:15) (98% - 99%)    PHYSICAL EXAM:  Constitutional: NAD, awake and alert  HEENT:  EOMI,  Pupils round, No oral cyanosis.  Pulmonary: Non-labored, breath sounds are clear bilaterally  Cardiovascular: S1 and S2, regular rate and rhythm  Gastrointestinal: Bowel Sounds present, soft, nontender.   Lymph: No edema. No cervical lymphadenopathy.  Neurological: Alert, no focal deficits  Skin: No rashes.  Psych:  Mood & affect appropriate, anxious    LABS:                      12.2   7.74  )-----------( 175      ( 09 Sep 2021 01:28 )             34.5              139    |  107    |  54     ----------------------------<  196    4.1     |  25     |  3.35     Ca    8.9        08 Sep 2021 15:36  Mg     2.4       08 Sep 2021 15:36    TPro  7.2    /  Alb  3.6    /  TBili  0.5    /  DBili  x      /  AST  15     /  ALT  24     /  AlkPhos  98     08 Sep 2021 15:36    PT/INR - ( 08 Sep 2021 15:36 )   PT: 10.9 sec;   INR: 0.93 ratio    PTT - ( 09 Sep 2021 01:28 )  PTT:> 200 sec    CARDIAC MARKERS ( 08 Sep 2021 18:09 ) 0.085 ng/mL / x     / x     / x     / x      CARDIAC MARKERS ( 08 Sep 2021 15:36 ) 0.094 ng/mL / x     / x     / x     / x        Pro Bnp [id number]  TSH: 0.99    Tele:  SR    Admission ECG:   sinus rhythm     ECHO  Prelim  LVH Normal LVEF

## 2021-09-10 NOTE — PROGRESS NOTE ADULT - ASSESSMENT
57 y/o M PMHx significant for hypertension, and diabetes mellitus type 2, presents to  for further evaluation and management of shortness of breath and palpitations over the past 2 days. 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 Labetalol   ~cont. Amlodipine     #Diabetes Mellitus   ~ A1c 9.2  ~ add lantus 10   ~cont. ISS per protocol    # Acute on chronic RUDY   - Has nephrologist- Dr. Zuniga   - Unsure of what his last GFR/CR are   - Nephrology consult    #Vte ppx  ~cont. Heparin gtt  
55 yo male with HTN, DM and CKD Cr ~ 2 in 2020 has not followed up w Dr bourne since then. Now presenting with Afib RVR w Cr in 3's     RUDY on CKD vs CKD progression,  with latter more likley    advised pt on importance of compliance , likely CKD progression   outpatient follow up discussed  If discharged, would need labs within a week, f/u with me within 10 days     AF  -[facility]    d/c with RN, dr. Traore

## 2021-09-10 NOTE — DISCHARGE NOTE PROVIDER - CARE PROVIDERS DIRECT ADDRESSES
,DirectAddress_Unknown,[email or web address],DirectAddress_Unknown

## 2021-09-10 NOTE — PROGRESS NOTE ADULT - PROBLEM SELECTOR PLAN 1
Highly symptomatic atrial fibrillation upon presentation and with spontaneous conversion to sinus rhythm  without recurrence of afib on current medication ,  Eliquis (CHADS-VASC = 2)  echo showed hypertensive heart disease with normal LVEF   Outpatient stress test if normal LVEF.

## 2021-09-10 NOTE — DISCHARGE NOTE PROVIDER - HOSPITAL COURSE
CC: SOB  HPI and Hospital course: 57 y/o M PMHx significant for hypertension, and diabetes mellitus type 2, presents to  for further evaluation and management of shortness of breath and palpitations over the past 2 days. 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    VITALS:  T(F): 96.9 (09-10-21 @ 09:13), Max: 97.7 (09-09-21 @ 20:13)  HR: 75 (09-10-21 @ 09:13) (69 - 79)  BP: 143/80 (09-10-21 @ 09:13) (125/60 - 143/80)  RR: 18 (09-10-21 @ 09:13) (18 - 19)  SpO2: 98% (09-10-21 @ 09:13) (98% - 99%)    PHYSICAL EXAM:  General: NAD, lying in bed  HEENT:  pupils equal and reactive, EOMI, no oropharyngeal lesions, erythema, exudates, oral thrush  NECK:   supple, no carotid bruits, no palpable lymph nodes, no thyromegaly  CV:  +S1, +S2, regular, no murmurs or rubs  RESP:   lungs clear to auscultation bilaterally, no wheezing, rales, rhonchi, good air entry bilaterally  BREAST:  not examined  GI:  abdomen soft, non-tender, non-distended, normal BS, no bruits, no abdominal masses, no palpable masses  RECTAL:  not examined  :  not examined  MSK:   normal muscle tone, no atrophy, no rigidity, no contractions  EXT:  no clubbing, no cyanosis, no edema, no calf pain, swelling or erythema  VASCULAR:  pulses equal and symmetric in the upper and lower extremities  NEURO:  AAOX3, no focal neurological deficits, follows all commands, able to move extremities spontaneously  SKIN:  no ulcers, lesions or rashes    Renal u/s 9/10/21: No hydronephrosis. Postvoid bladder residual of 88 mL. Mild bladder wall thickening.  CXR 9/8/21: No radiographic evidence of active chest disease.    Echo 9/9/21:  Mild concentric left ventricular hypertrophy is present.   Estimated left ventricular ejection fraction is 60-65 %.   The aortic valve is trileaflet with thin pliable leaflets.   No aortic stenosis is present.   No aortic regurgitation is present.   Fibrocalcific changes noted to the mitral valve leaflets with preserved   leaflet excursion.   Mild mitral annular calcification is present.   Mild (1+) mitral regurgitation is present.   EA reversal of the mitral inflow consistent with reduced compliance of the   left ventricle.   The tricuspid valve leaflets are thin and pliable; valve motion is normal.   Trace tricuspid valve regurgitation is present.        1) New Onset Atrial Fibrillation w/ RVR  -with type 2 demand MI in setting of RVR, trops downtrended  -echo as above  -Diltiazem, eliquis ($40 copay)  -outpt cards f/u and outpt stress test    #Hypertension  -labetalol  -stop norvasc  -started on diltiazem    #Diabetes Mellitus   -A1c 9.2, Pt to f/u within the week with Dr. Ortez to update DM regimen    # Acute on chronic RUDY  -Cr downtrending at time of d/c  -renal u/s neg  -outpt f/u with Dr. Zuniga    I have spent 45min on day of d/c coordinating care.

## 2021-09-10 NOTE — DISCHARGE NOTE PROVIDER - PROVIDER TOKENS
SLP Plan of Care Note  Communication/Cognition Treatment    Recommendations:  Communication/Cognition Treatment    Assessment:  Patient was seen on 5th Floor  for communication and cognition treatment.  Emphasis of session included addressing comm/cog goals established at [facility] including memory, problem solving, word finding and assessment of po tolerance of general/thin liquids as lunch arrived during session.    Pt is very pleasant and cooperative.  Pt has awareness of reason for hospitalization.  Oriented to month and reason for hospitalization however thought date was 28th, day Saturday and uncertain of city hospital was in.  Pt able to provide general info about self and day of CVA.  Son verified accuracy.    Pt tray arrived and hemianopsia on right very evident with SLP offering verbal cues as to location of items on tray and correction of utensil use.  Pt encouraged to use a right scan of tray .  Max cues provided.    Pt did tolerate all food consistencies on tray without any overt s/s aspiration or difficulty.  Current diet appropriate.    MEMORY:  Pt provided with 4-5 word stim and asked to recall a fact via question:  5/5 correct.  Stim increased to 8-10 words and accuracy reduced to 60% of recall of info provided.    Expectations of IRP/speech therapy were discussed with patient and patient son.   Continued skilled therapy is indicated to address comm/cog deficits (memory, problem solving, word finding, visuospatial).     Diagnosis: Acute ischemic stroke (CMS/HCC) [I63.9]    Prior Function:  Feeds Self: Yes  Liquids: Thin  Solids : General  Previous Video Swallow Studies/Interventions: no previous ST per Epic review       See SLP Flowsheet for goals and objective information addressed in this session.    Plan for Next Session:  Plan for Next Session: comm/cog goals    Frequency:  Frequency: M-F (SW1/CC1) by 3/30;MCF pt ate lunch during session and tolerated general/thin liquids without any overt s/s  aspiration or difficulty, d/c swallow goal; focus on comm/cog; anticipate move to IRP 3/25 or 3/26.   - see plan (3/24)    Education:  See Patient Education activity    Barriers to Discharge:   SLP Identified Barriers to Discharge: cognition    Recommendations for Discharge:  Recommendations for Discharge: SLP: IRP, post acute ST (03/24/19 1315)    Communication/Cognition Data Overview Last 24 hours       Subjective  Subjective Comments: Pt willing to work, very pleasant (03/24/19 1315)    Observation  Observation Comments: Son Royer and his wife Carlos present during session.  (03/24/19 1315)    Behavior/Social Interaction  Arousal and Alertness: Appropriate responses to stimuli (03/24/19 1315)  Cooperates with Tasks: Intact (03/24/19 1315)  Maintains Eye Contact: Mild impairment (03/24/19 1315)  Pragmatics: Intact (03/24/19 1315)  Appropriate Behavior: Intact (03/24/19 1315)  Emotional Lability: Intact (03/24/19 1315)  Affect: Intact (03/24/19 1315)  Frustration Level: Intact (03/24/19 1315)    Verbal Expression  Initiation: No impairment (03/24/19 1315)  Verbal Expression Comments: Expresses needs appropriately (03/24/19 1315)              Visual Recognition  Visual Acuity: Hemianopsia - right (03/24/19 1315)  Neglect: Right (03/24/19 1315)  Visual Recognition Comments: verbal cues provided to increase awareness of right side of lunch tray. (03/24/19 1315)              Attention  Alternating Attention: Intact (03/24/19 1315)  Sustained Attention: Intact (03/24/19 1315)    Orientation Level  Oriented to Person: Yes (03/24/19 1315)  Oriented to Place: No (03/24/19 1315)  Oriented to Month: Yes (03/24/19 1315)  Oriented to Year: No (03/24/19 1315)  Oriented to Date: No (03/24/19 1315)  Oriented to Day of Week: No (03/24/19 1315)  Oriented to Reason for Hospitilization: Yes (03/24/19 1315)    Memory  Immediate Memory: Moderate impairment (03/24/19 1315)  Memory Comments: Recalled info in 4-5 word sentence with 100%  accuracy, 8-10 word sentence 60% accurate.  (03/24/19 1315)                All Speech Therapy Goals:    SLP Goals  Short Term Goals to be Reviewed on : 03/30/19 (03/23/19 1115)  STG are the Same as Discharge Goals: Yes (03/23/19 1115)  Goal Agreement: Patient agrees with goals and treatment plan (03/23/19 1115)    Attention Short Term Goal 1  Attention STG 1: Pt will attend to the task at hand for 15 minutes with one reminder over 3 consecutive sessions. (03/23/19 1115)    Memory Short Term Goal 1  Memory STG 1: Pt will recall 4 units of information using memoryn strategies with 90% accuracy. (03/23/19 1115)    Memory STG 1: Pt will recall 4 units of information using memoryn strategies with 90% accuracy. (03/23/19 1115)    Problem Solving Short Term Goal 1  Problem Solving STG 1: Pt will solve situational problems with 90% accuracy. (03/23/19 1115)                                  Visual/Reading Comprehension Short Term Goal 1  Visual/Reading Comprehension STG 1: Pt will participate in visual tasks with increase attention to the right with 75% accuracy. (03/23/19 1115)              Swallowing Short Term Goal 1  Swallowing STG 1: Pt will tolerate least restrictive diet with <10% s/s of aspiration. (03/23/19 1115)                                                      SLP Time Spent: 50 min (03/24/19 1315)       PROVIDER:[TOKEN:[[id number]],FOLLOWUP:[2 weeks]],PROVIDER:[TOKEN:[[id number]],FOLLOWUP:[2 weeks]],PROVIDER:[TOKEN:[[id number]],FOLLOWUP:[1 week]],PROVIDER:[TOKEN:[[id number]],FOLLOWUP:[1 week]]

## 2021-09-10 NOTE — PROGRESS NOTE ADULT - REASON FOR ADMISSION
Shortness of Breath, Palpitations

## 2021-09-10 NOTE — DISCHARGE NOTE PROVIDER - NSDCCPCAREPLAN_GEN_ALL_CORE_FT
PRINCIPAL DISCHARGE DIAGNOSIS  Diagnosis: Rapid atrial fibrillation  Assessment and Plan of Treatment: You have been started on diltiazem and eliquis for new afib. Stop taking norvasc      SECONDARY DISCHARGE DIAGNOSES  Diagnosis: Type 2 diabetes mellitus  Assessment and Plan of Treatment: Follow up with Dr. Ortez within the week

## 2021-09-10 NOTE — PROGRESS NOTE ADULT - PROBLEM SELECTOR PLAN 2
Mr Colunga has a history of HTN which is improved on labetalol ,  norvasc discontinued , started on cardizem , for AFIB     continue cardizem  eliquis , explained to patient. explained to patient

## 2021-09-13 PROBLEM — E11.9 TYPE 2 DIABETES MELLITUS WITHOUT COMPLICATIONS: Chronic | Status: ACTIVE | Noted: 2021-09-08

## 2021-09-13 PROBLEM — I10 ESSENTIAL (PRIMARY) HYPERTENSION: Chronic | Status: ACTIVE | Noted: 2021-09-08

## 2021-09-14 ENCOUNTER — NON-APPOINTMENT (OUTPATIENT)
Age: 56
End: 2021-09-14

## 2021-09-14 ENCOUNTER — APPOINTMENT (OUTPATIENT)
Dept: CARDIOLOGY | Facility: CLINIC | Age: 56
End: 2021-09-14
Payer: MEDICARE

## 2021-09-14 VITALS
DIASTOLIC BLOOD PRESSURE: 80 MMHG | HEIGHT: 67 IN | OXYGEN SATURATION: 99 % | HEART RATE: 88 BPM | BODY MASS INDEX: 31.39 KG/M2 | WEIGHT: 200 LBS | SYSTOLIC BLOOD PRESSURE: 116 MMHG

## 2021-09-14 DIAGNOSIS — Z78.9 OTHER SPECIFIED HEALTH STATUS: ICD-10-CM

## 2021-09-14 PROCEDURE — 93000 ELECTROCARDIOGRAM COMPLETE: CPT

## 2021-09-14 PROCEDURE — 99214 OFFICE O/P EST MOD 30 MIN: CPT

## 2021-09-14 NOTE — DISCUSSION/SUMMARY
[FreeTextEntry1] : \par Atrial fibrillation: Paroxysmal - currently in sinus rhythm; continue anticoagulation with Eliquis; continue diltiazem  mg daily.  Return for exercise ECG stress test.\par \par Hypertension: Satisfactory control; continue present antihypertensive regimen.\par \par Diabetes mellitus: Poorly controlled with hemoglobin A1c greater than 9%; patient will be seen his endocrinologist later this month for titration of medication.\par \par Chronic kidney disease: Evidence of progression of known CKD with rise in creatinine; patient intends to followup with Dr. Zuniga

## 2021-09-14 NOTE — REVIEW OF SYSTEMS
[SOB] : no shortness of breath [Dyspnea on exertion] : not dyspnea during exertion [Palpitations] : no palpitations [Under Stress] : under stress [Easy Bleeding] : no tendency for easy bleeding [Negative] : Heme/Lymph

## 2021-09-14 NOTE — PHYSICAL EXAM
[No Acute Distress] : no acute distress [Normal S1, S2] : normal S1, S2 [No Murmur] : no murmur [Clear Lung Fields] : clear lung fields [Soft] : abdomen soft [Normal Gait] : normal gait [No Edema] : no edema [No Rash] : no rash [Moves all extremities] : moves all extremities [Alert and Oriented] : alert and oriented [de-identified] : appears his stated age [de-identified] : pupils are round [de-identified] : Wearing a face mask [de-identified] : No JVD

## 2021-09-14 NOTE — HISTORY OF PRESENT ILLNESS
[FreeTextEntry1] : Eber Colunga is a 56-year-old man with a history of hypertension, diabetes mellitus, chronic kidney disease, and obesity who was recently hospitalized in [place] with new onset atrial fibrillation.  He was symptomatic + tachycardic upon arrival and treated with IV heparin and IV diltiazem with subsequent spontaneous return of sinus rhythm. He was also found to have worsening creatinine suggestive of CKD progression (creatinine 3.0 upon discharge).\par \par He has felt well since hospital discharge - no recurrence of palpitations or dyspnea; tolerating prescribed pharmacotherapy.

## 2021-09-14 NOTE — CARDIOLOGY SUMMARY
[de-identified] : 9/14/2021.  Sinus  Rhythm. Possible left atrial enlargement [de-identified] : 9/9/2021.  Normal left ventricular size with mild concentric LVH and estimated ejection fraction 60-65%. Mild diastolic dysfunction. Mild mitral regurgitation. Trace tricuspid regurgitation.

## 2022-02-23 ENCOUNTER — NON-APPOINTMENT (OUTPATIENT)
Age: 57
End: 2022-02-23

## 2022-02-23 ENCOUNTER — APPOINTMENT (OUTPATIENT)
Dept: CARDIOLOGY | Facility: CLINIC | Age: 57
End: 2022-02-23
Payer: COMMERCIAL

## 2022-02-23 VITALS — DIASTOLIC BLOOD PRESSURE: 90 MMHG | SYSTOLIC BLOOD PRESSURE: 160 MMHG

## 2022-02-23 VITALS
HEART RATE: 88 BPM | DIASTOLIC BLOOD PRESSURE: 98 MMHG | WEIGHT: 191 LBS | SYSTOLIC BLOOD PRESSURE: 158 MMHG | BODY MASS INDEX: 29.98 KG/M2 | HEIGHT: 67 IN | OXYGEN SATURATION: 100 %

## 2022-02-23 PROCEDURE — 99214 OFFICE O/P EST MOD 30 MIN: CPT

## 2022-02-23 PROCEDURE — 93000 ELECTROCARDIOGRAM COMPLETE: CPT

## 2022-02-23 RX ORDER — EXENATIDE 2 MG/.85ML
2 INJECTION, SUSPENSION, EXTENDED RELEASE SUBCUTANEOUS
Refills: 0 | Status: DISCONTINUED | COMMUNITY
End: 2022-02-23

## 2022-02-23 RX ORDER — DULAGLUTIDE 4.5 MG/.5ML
INJECTION, SOLUTION SUBCUTANEOUS
Refills: 0 | Status: ACTIVE | COMMUNITY

## 2022-02-23 NOTE — HISTORY OF PRESENT ILLNESS
[FreeTextEntry1] : bEer Colunga is a 56-year-old man with a history of hypertension, diabetes mellitus, chronic kidney disease, obesity, and atrial fibrillation (paroxysmal; diagnosed during hospitalization in September 2021) who returns for cardiac examination.  He never returned for exercise ECG stress testing (ordered during our last encounter).  He tells me that his health insurance changed and he needs "all new doctors" - including nephrology, endocrinology.  He has been feeling well and offers no new complaints during today's visit.  He has not been experiencing palpitations, dyspnea, or chest pain. He has been tolerating prescribed pharmacotherapy.

## 2022-02-23 NOTE — DISCUSSION/SUMMARY
[With Me] : with me [___ Week(s)] : in [unfilled] week(s) [FreeTextEntry1] : \par Hypertension: Blood pressure is poorly controlled (lower during our last encounter in September 2021); increased dose of labetalol to 300 mg b.i.d. - return to see me in 3 weeks to reassess blood pressure and titrate medications again as needed.\par \par Paroxysmal atrial fibrillation: Currently in sinus rhythm; continue anticoagulation with Eliquis.\par \par Chronic kidney disease: Previously under the care of Dr. Zuniga -- apparently not accepting current health insurance; I provided patient with name of another nephrologist in [place].\par \par Diabetes mellitus: Patient describes improved glycemic control with hemoglobin A1c of approximately 7.1% several months ago; patient is trying to find a new endocrinologist but in the interim will continue Jardiance and Trulicity.

## 2022-02-23 NOTE — REVIEW OF SYSTEMS
[Under Stress] : under stress [Negative] : Heme/Lymph [Weight Loss (___ Lbs)] : [unfilled] ~Ulb weight loss [SOB] : no shortness of breath [Dyspnea on exertion] : not dyspnea during exertion [Palpitations] : no palpitations [Easy Bleeding] : no tendency for easy bleeding

## 2022-02-23 NOTE — PHYSICAL EXAM
[No Acute Distress] : no acute distress [Normal S1, S2] : normal S1, S2 [No Murmur] : no murmur [Clear Lung Fields] : clear lung fields [Soft] : abdomen soft [Normal Gait] : normal gait [No Edema] : no edema [Moves all extremities] : moves all extremities [Alert and Oriented] : alert and oriented [Appears Anxious] : appears anxious [de-identified] : ALEXANDRU [de-identified] : Wearing a face mask

## 2022-02-23 NOTE — CARDIOLOGY SUMMARY
[de-identified] : 2/23/22.  Sinus  Rhythm.  Early repolarization [de-identified] : 9/9/2021.  Normal left ventricular size with mild concentric LVH and estimated ejection fraction 60-65%. Mild diastolic dysfunction. Mild mitral regurgitation. Trace tricuspid regurgitation.

## 2022-03-18 ENCOUNTER — APPOINTMENT (OUTPATIENT)
Dept: CARDIOLOGY | Facility: CLINIC | Age: 57
End: 2022-03-18

## 2022-07-05 ENCOUNTER — APPOINTMENT (OUTPATIENT)
Dept: CARDIOLOGY | Facility: CLINIC | Age: 57
End: 2022-07-05

## 2022-07-05 ENCOUNTER — RX RENEWAL (OUTPATIENT)
Age: 57
End: 2022-07-05

## 2022-07-05 ENCOUNTER — NON-APPOINTMENT (OUTPATIENT)
Age: 57
End: 2022-07-05

## 2022-07-05 VITALS
BODY MASS INDEX: 29.98 KG/M2 | HEIGHT: 67 IN | HEART RATE: 94 BPM | WEIGHT: 191 LBS | SYSTOLIC BLOOD PRESSURE: 148 MMHG | OXYGEN SATURATION: 97 % | DIASTOLIC BLOOD PRESSURE: 92 MMHG

## 2022-07-05 PROCEDURE — 93000 ELECTROCARDIOGRAM COMPLETE: CPT

## 2022-07-05 PROCEDURE — 99214 OFFICE O/P EST MOD 30 MIN: CPT | Mod: 25

## 2022-07-12 NOTE — HISTORY OF PRESENT ILLNESS
[FreeTextEntry1] : This is a 55 Y/O male PMH: HTN, DM, CKD, atrial fibrillation (paroxysmal; diagnosed during hospitalization in September 2021) who presents today with a CC of  TOWNSEND associated with " Irregular HR "  no associated CP/Dizziness/Syncope \par \par Echo: 9/9/2021. Normal left ventricular size with mild concentric LVH and estimated ejection fraction 60-65%. Mild diastolic dysfunction. Mild mitral regurgitation. Trace tricuspid regurgitation.

## 2022-07-12 NOTE — DISCUSSION/SUMMARY
[With Me] : with me [___ Week(s)] : in [unfilled] week(s) [FreeTextEntry1] : TOWNSEND associated with " Irregular HR " No CP\par EKG today SR HR 91 BPM\par Echo: 9/9/2021. Normal left ventricular size with mild concentric LVH and estimated ejection fraction 60-65%. Mild diastolic dysfunction. Mild mitral regurgitation. Trace tricuspid regurgitation.\par Will repeat to asses for interval change/Arrhythmia induced CM \par 2 week Zio assess AF Emma\par Blood pressure elevated HR 91 BPM CCB uptitrtaed with a 2 week OV F/U\par Labs ordered \par \par Paroxysmal atrial fibrillation: Currently in sinus rhythm; continue oral AC\par \par Chronic kidney disease: Medical management with Nephrology have referred to Dr Taylor \par \par Diabetes mellitus: Referred to Dr Rajan and is followed PCP\par \par Plan DW patient

## 2022-07-13 ENCOUNTER — NON-APPOINTMENT (OUTPATIENT)
Age: 57
End: 2022-07-13

## 2022-07-13 LAB
ALBUMIN SERPL ELPH-MCNC: 4.3 G/DL
ALP BLD-CCNC: 120 U/L
ALT SERPL-CCNC: 10 U/L
ANION GAP SERPL CALC-SCNC: 15 MMOL/L
AST SERPL-CCNC: 13 U/L
BASOPHILS # BLD AUTO: 0.07 K/UL
BASOPHILS NFR BLD AUTO: 0.8 %
BILIRUB SERPL-MCNC: 0.3 MG/DL
BUN SERPL-MCNC: 50 MG/DL
CALCIUM SERPL-MCNC: 9.8 MG/DL
CHLORIDE SERPL-SCNC: 106 MMOL/L
CO2 SERPL-SCNC: 21 MMOL/L
CREAT SERPL-MCNC: 3.93 MG/DL
EGFR: 17 ML/MIN/1.73M2
EOSINOPHIL # BLD AUTO: 0.17 K/UL
EOSINOPHIL NFR BLD AUTO: 2 %
GLUCOSE SERPL-MCNC: 205 MG/DL
HCT VFR BLD CALC: 41.3 %
HGB BLD-MCNC: 14.1 G/DL
IMM GRANULOCYTES NFR BLD AUTO: 0.5 %
LYMPHOCYTES # BLD AUTO: 2.22 K/UL
LYMPHOCYTES NFR BLD AUTO: 26 %
MAGNESIUM SERPL-MCNC: 2.1 MG/DL
MAN DIFF?: NORMAL
MCHC RBC-ENTMCNC: 30.9 PG
MCHC RBC-ENTMCNC: 34.1 GM/DL
MCV RBC AUTO: 90.6 FL
MONOCYTES # BLD AUTO: 0.63 K/UL
MONOCYTES NFR BLD AUTO: 7.4 %
NEUTROPHILS # BLD AUTO: 5.41 K/UL
NEUTROPHILS NFR BLD AUTO: 63.3 %
PLATELET # BLD AUTO: 200 K/UL
POTASSIUM SERPL-SCNC: 4.9 MMOL/L
PROT SERPL-MCNC: 6.9 G/DL
RBC # BLD: 4.56 M/UL
RBC # FLD: 13.2 %
SODIUM SERPL-SCNC: 141 MMOL/L
T3 SERPL-MCNC: 85 NG/DL
T4 SERPL-MCNC: 5.6 UG/DL
TSH SERPL-ACNC: 0.94 UIU/ML
WBC # FLD AUTO: 8.54 K/UL

## 2022-07-14 ENCOUNTER — NON-APPOINTMENT (OUTPATIENT)
Age: 57
End: 2022-07-14

## 2022-07-14 ENCOUNTER — APPOINTMENT (OUTPATIENT)
Dept: CARDIOLOGY | Facility: CLINIC | Age: 57
End: 2022-07-14

## 2022-07-14 VITALS
HEIGHT: 67 IN | OXYGEN SATURATION: 100 % | WEIGHT: 193 LBS | HEART RATE: 94 BPM | SYSTOLIC BLOOD PRESSURE: 144 MMHG | DIASTOLIC BLOOD PRESSURE: 82 MMHG | BODY MASS INDEX: 30.29 KG/M2

## 2022-07-14 PROCEDURE — 99214 OFFICE O/P EST MOD 30 MIN: CPT | Mod: 25

## 2022-07-14 PROCEDURE — 93000 ELECTROCARDIOGRAM COMPLETE: CPT

## 2022-07-14 NOTE — HISTORY OF PRESENT ILLNESS
[FreeTextEntry1] : This is a 55 Y/O male PMH: HTN, DM, CKD, atrial fibrillation (paroxysmal; diagnosed during hospitalization in September 2021) who presents today with a CC of  TOWNSEND associated with " Irregular HR "  no associated CP/Dizziness/Syncope here today for BP check after increasing diltiazem 2/2 above symptoms which patient reports symptoms have improved with this medication change \par Monitor currently in place \par \par Echo: 9/9/2021. Normal left ventricular size with mild concentric LVH and estimated ejection fraction 60-65%. Mild diastolic dysfunction. Mild mitral regurgitation. Trace tricuspid regurgitation.

## 2022-07-14 NOTE — DISCUSSION/SUMMARY
[With Me] : with me [___ Week(s)] : in [unfilled] week(s) [FreeTextEntry1] : Here today in follow up of medication changes done 2/2 TOWNSEND associated with " Irregular HR " \par Patient reports improvement in symptoms with recent up titration of Diltiazem \par \par F/U Echo pending\par \par Telemetry monitor in place\par \par Will return for stress test given risk factors of DM/HTN/HLD\par \par Paroxysmal atrial fibrillation: Currently in sinus rhythm; continue oral AC/current medications \par \par Chronic kidney disease: Medical management with Nephrology have referred to Dr Taylor \par \par Diabetes mellitus: Referred to Dr Rajan and is followed PCP\par \par OV after testing\par \par Plan DW patient  [email or web address]

## 2022-07-14 NOTE — REVIEW OF SYSTEMS
[Dyspnea on exertion] : dyspnea during exertion [Palpitations] : palpitations [Negative] : Heme/Lymph [FreeTextEntry5] : See HPI

## 2022-07-28 ENCOUNTER — APPOINTMENT (OUTPATIENT)
Dept: CARDIOLOGY | Facility: CLINIC | Age: 57
End: 2022-07-28

## 2022-07-28 PROCEDURE — 93306 TTE W/DOPPLER COMPLETE: CPT

## 2022-08-04 ENCOUNTER — APPOINTMENT (OUTPATIENT)
Dept: CARDIOLOGY | Facility: CLINIC | Age: 57
End: 2022-08-04

## 2022-08-29 ENCOUNTER — APPOINTMENT (OUTPATIENT)
Dept: CARDIOLOGY | Facility: CLINIC | Age: 57
End: 2022-08-29

## 2022-08-29 PROCEDURE — 93015 CV STRESS TEST SUPVJ I&R: CPT

## 2022-09-07 ENCOUNTER — APPOINTMENT (OUTPATIENT)
Dept: CARDIOLOGY | Facility: CLINIC | Age: 57
End: 2022-09-07

## 2022-09-07 VITALS
TEMPERATURE: 97.7 F | BODY MASS INDEX: 30.76 KG/M2 | SYSTOLIC BLOOD PRESSURE: 180 MMHG | DIASTOLIC BLOOD PRESSURE: 90 MMHG | WEIGHT: 196 LBS | HEIGHT: 67 IN | OXYGEN SATURATION: 98 % | HEART RATE: 91 BPM

## 2022-09-07 VITALS — DIASTOLIC BLOOD PRESSURE: 90 MMHG | SYSTOLIC BLOOD PRESSURE: 168 MMHG

## 2022-09-07 PROCEDURE — 99214 OFFICE O/P EST MOD 30 MIN: CPT

## 2022-09-07 RX ORDER — EMPAGLIFLOZIN 25 MG/1
25 TABLET, FILM COATED ORAL DAILY
Refills: 0 | Status: DISCONTINUED | COMMUNITY
End: 2022-09-07

## 2022-09-07 NOTE — PHYSICAL EXAM
[No Acute Distress] : no acute distress [Normal S1, S2] : normal S1, S2 [No Murmur] : no murmur [Clear Lung Fields] : clear lung fields [No Edema] : no edema [Moves all extremities] : moves all extremities [Alert and Oriented] : alert and oriented [de-identified] :   Overweight [de-identified] :  pupils are round [de-identified] :  wearing a facemask

## 2022-09-07 NOTE — REVIEW OF SYSTEMS
[Feeling Fatigued] : feeling fatigued [Dyspnea on exertion] : dyspnea during exertion [Chest Discomfort] : no chest discomfort [Anxiety] : anxiety [Under Stress] : under stress

## 2022-09-07 NOTE — DISCUSSION/SUMMARY
[With Me] : with me [___ Month(s)] : in [unfilled] month(s) [FreeTextEntry1] : \par Hypertension: Suboptimal control; continue present antihypertensive regimen but increase dose of diltiazem to 360 mg daily\par \par Atrial fibrillation: Paroxysmal but with low burden of arrhythmia; continue Eliquis\par \par Chronic kidney disease: Advanced; patient anticipates future dialysis and is exploring options for renal transplant.

## 2022-09-07 NOTE — CARDIOLOGY SUMMARY
[de-identified] : 8/29/22.    Completed 8 minutes of the  protocol.  Hypertensive blood pressure response.  No ischemic ECG changes with exercise. [de-identified] : 7/28/2022.  Normal left ventricular systolic function with estimated ejection fraction 61%.  Mild diastolic dysfunction.  Moderate concentric LVH.  Normal right ventricular size and function.  Mild left atrial enlargement.  Normal estimated pulmonary artery pressure.

## 2022-09-07 NOTE — HISTORY OF PRESENT ILLNESS
[FreeTextEntry1] : Eber Colunga is a 57-year-old man with a history of hypertension, diabetes mellitus, chronic kidney disease, obesity, and atrial fibrillation (paroxysmal; diagnosed during hospitalization in September 2021) who returns for cardiac examination.  During our last encounter in February 2022, I uptitrated his dose of labetalol and asked him to return to see me 3 weeks later --however, he did not come in until July.  He underwent recent noninvasive cardiac testing because of complaints of dyspnea.  Labs have revealed worsening renal insufficiency with recent creatinine 3.9.  He recently established care with a nephrologist, Dr. Candido Mooney,  and he will be meeting with a renal transplant team in November to discuss management of  chronic and worsening kidney disease.  He describes poorly controlled hypertension --  blood pressure was very high when he saw Dr. Mooney  and also during recent exercise ECG stress test;  he reports good adherence with prescribed pharmacotherapy.  He describes exertional dyspnea and decreased exercise tolerance.

## 2022-09-22 RX ORDER — APIXABAN 5 MG/1
5 TABLET, FILM COATED ORAL TWICE DAILY
Qty: 180 | Refills: 1 | Status: ACTIVE | COMMUNITY
Start: 1900-01-01 | End: 1900-01-01

## 2022-10-14 ENCOUNTER — APPOINTMENT (OUTPATIENT)
Dept: CARDIOLOGY | Facility: CLINIC | Age: 57
End: 2022-10-14

## 2022-10-14 VITALS
TEMPERATURE: 97.2 F | SYSTOLIC BLOOD PRESSURE: 190 MMHG | DIASTOLIC BLOOD PRESSURE: 96 MMHG | OXYGEN SATURATION: 99 % | HEIGHT: 67 IN | HEART RATE: 81 BPM

## 2022-10-14 VITALS — DIASTOLIC BLOOD PRESSURE: 92 MMHG | SYSTOLIC BLOOD PRESSURE: 164 MMHG

## 2022-10-14 DIAGNOSIS — R06.02 SHORTNESS OF BREATH: ICD-10-CM

## 2022-10-14 PROCEDURE — 99214 OFFICE O/P EST MOD 30 MIN: CPT

## 2022-10-14 RX ORDER — ROSUVASTATIN CALCIUM 10 MG/1
10 TABLET, FILM COATED ORAL
Refills: 0 | Status: ACTIVE | COMMUNITY

## 2022-10-14 NOTE — REVIEW OF SYSTEMS
[Under Stress] : under stress [Headache] : no headache [SOB] : no shortness of breath [Chest Discomfort] : no chest discomfort

## 2022-10-14 NOTE — HISTORY OF PRESENT ILLNESS
[FreeTextEntry1] : Eber Colunga is a 57-year-old man with a history of hypertension, long standing diabetes mellitus, progressive chronic kidney disease (previously under the care of Dr. Joselito Zuniga and now seeing Dr. Candido Mooney; recent creatinine was approximately 3.9), obesity, and atrial fibrillation (paroxysmal; diagnosed during hospitalization in 2021) who returns for cardiac examination.  He has had uncontrolled hypertension during past visits I have uptitrated his doses of both labetalol and diltiazem -- Blood pressure remains persistently elevated and he has a note written from his nephrologist suggesting that I prescribed hydralazine.  Mr Colunga Has been taking an erroneous dose of labetalol and "" tablets (400 mg twice daily;  I had previously prescribed 300 mg 3 times daily).  He continues to feel well -  anxious about his health but no chest pain, difficulty breathing, headaches, or palpitations.

## 2022-10-14 NOTE — DISCUSSION/SUMMARY
[With Me] : with me [___ Month(s)] : in [unfilled] month(s) [FreeTextEntry1] : \par Hypertension: Hypertension is labile and poorly controlled;  increase dose of labetalol by 100 mg per day  to 900 mg total (divided);  add hydralazine. I asked Mr. Colunga  to purchase a home blood pressure monitor and to contact me in 2 weeks to discuss measurements.\par \par Atrial fibrillation: Paroxysmal; continue Eliquis\par \par Chronic kidney disease: Advanced;  compliant with Rx and follow-up;  plans to meet with a transplant team  later this year.\par \par Shortness of breath: Improved.

## 2022-11-16 ENCOUNTER — APPOINTMENT (OUTPATIENT)
Dept: CARDIOLOGY | Facility: CLINIC | Age: 57
End: 2022-11-16

## 2022-11-16 ENCOUNTER — NON-APPOINTMENT (OUTPATIENT)
Age: 57
End: 2022-11-16

## 2022-11-16 VITALS — OXYGEN SATURATION: 99 % | HEIGHT: 67 IN | HEART RATE: 95 BPM | BODY MASS INDEX: 31.08 KG/M2 | WEIGHT: 198 LBS

## 2022-11-16 VITALS — DIASTOLIC BLOOD PRESSURE: 80 MMHG | SYSTOLIC BLOOD PRESSURE: 158 MMHG

## 2022-11-16 DIAGNOSIS — R42 DIZZINESS AND GIDDINESS: ICD-10-CM

## 2022-11-16 PROCEDURE — 99214 OFFICE O/P EST MOD 30 MIN: CPT | Mod: 25

## 2022-11-16 PROCEDURE — 93000 ELECTROCARDIOGRAM COMPLETE: CPT

## 2022-11-16 NOTE — PHYSICAL EXAM
[No Acute Distress] : no acute distress [Normal S1, S2] : normal S1, S2 [Clear Lung Fields] : clear lung fields [Normal Gait] : normal gait [No Edema] : no edema [Alert and Oriented] : alert and oriented [No Respiratory Distress] : no respiratory distress  [de-identified] :   Overweight [de-identified] :  wearing a facemask

## 2022-11-16 NOTE — HISTORY OF PRESENT ILLNESS
[FreeTextEntry1] : Eber Colunga is a 57-year-old man with a history of hypertension, long standing diabetes mellitus, progressive chronic kidney disease (previously under the care of Dr. Joselito Zuniga and now seeing Dr. Candido Mooney; recent creatinine was approximately 3.9), obesity, and atrial fibrillation (paroxysmal; diagnosed during hospitalization in September 2021) who returns for cardiac examination.  He has had poorly controlled hypertension and during our last encounter on October 14 I increased his dose of labetalol and added hydralazine.  He has been tolerating therapy–he says he feels cold since the weather changed  and feels "a little dizzy" today but otherwise doing okay.

## 2022-11-16 NOTE — CARDIOLOGY SUMMARY
[de-identified] : 11/16/22.  Sinus  Rhythm  [de-identified] : 8/29/22.    Completed 8 minutes of the  protocol.  Hypertensive blood pressure response.  No ischemic ECG changes with exercise. [de-identified] : 7/28/2022.  Normal left ventricular systolic function with estimated ejection fraction 61%.  Mild diastolic dysfunction.  Moderate concentric LVH.  Normal right ventricular size and function.  Mild left atrial enlargement.  Normal estimated pulmonary artery pressure.

## 2022-11-16 NOTE — REVIEW OF SYSTEMS
[Under Stress] : under stress [Headache] : no headache [Weight Loss (___ Lbs)] : no recent weight loss [SOB] : no shortness of breath [Chest Discomfort] : no chest discomfort [Dizziness] : dizziness

## 2022-11-16 NOTE — DISCUSSION/SUMMARY
[With Me] : with me [___ Month(s)] : in [unfilled] month(s) [FreeTextEntry1] : \par Hypertension: blood pressure is slowly improving but is not well controlled; continue present doses of hydralazine and diltiazem;  increase labetalol to 600 mg twice daily.  I asked him to return to see me in 4 weeks but to contact me sooner (2 weeks)  to discuss blood pressure response to this titration.\par \par Atrial fibrillation: Paroxysmal;  currently in sinus rhythm;continue Eliquis\par \par Dizziness: Unclear etiology; ECG normal

## 2022-12-21 ENCOUNTER — APPOINTMENT (OUTPATIENT)
Dept: CARDIOLOGY | Facility: CLINIC | Age: 57
End: 2022-12-21

## 2022-12-21 VITALS
HEIGHT: 67 IN | SYSTOLIC BLOOD PRESSURE: 130 MMHG | HEART RATE: 95 BPM | OXYGEN SATURATION: 98 % | BODY MASS INDEX: 29.72 KG/M2 | WEIGHT: 189.37 LBS | DIASTOLIC BLOOD PRESSURE: 70 MMHG

## 2022-12-21 VITALS — DIASTOLIC BLOOD PRESSURE: 78 MMHG | SYSTOLIC BLOOD PRESSURE: 136 MMHG

## 2022-12-21 PROCEDURE — 99214 OFFICE O/P EST MOD 30 MIN: CPT

## 2022-12-21 RX ORDER — REPAGLINIDE 1 MG/1
1 TABLET ORAL DAILY
Refills: 0 | Status: ACTIVE | COMMUNITY

## 2022-12-23 ENCOUNTER — RX RENEWAL (OUTPATIENT)
Age: 57
End: 2022-12-23

## 2022-12-27 NOTE — PHYSICAL EXAM
[No Acute Distress] : no acute distress [Normal S1, S2] : normal S1, S2 [Clear Lung Fields] : clear lung fields [Normal Gait] : normal gait [No Edema] : no edema [Appears Anxious] : appears anxious [de-identified] :   Overweight [de-identified] :  wearing a facemask

## 2022-12-27 NOTE — CARDIOLOGY SUMMARY
[de-identified] : 11/16/22.  Sinus  Rhythm  [de-identified] : 8/29/22.    Completed 8 minutes of the  protocol.  Hypertensive blood pressure response.  No ischemic ECG changes with exercise. [de-identified] : 7/28/2022.  Normal left ventricular systolic function with estimated ejection fraction 61%.  Mild diastolic dysfunction.  Moderate concentric LVH.  Normal right ventricular size and function.  Mild left atrial enlargement.  Normal estimated pulmonary artery pressure.

## 2022-12-27 NOTE — REVIEW OF SYSTEMS
[Dizziness] : dizziness [Under Stress] : under stress [Headache] : no headache [Weight Loss (___ Lbs)] : no recent weight loss [SOB] : no shortness of breath [Chest Discomfort] : no chest discomfort

## 2022-12-27 NOTE — HISTORY OF PRESENT ILLNESS
[FreeTextEntry1] : Eber Colunga is a 57-year-old man with a history of hypertension, long standing diabetes mellitus, progressive chronic kidney disease (seeing Dr. Candido Mooney; recent creatinine was approximately 3.9), obesity, and atrial fibrillation (paroxysmal; diagnosed during hospitalization in September 2021) who returns for cardiac examination.  He has had poorly controlled hypertension and during our last encounter in mid November I increase his dose of labetalol to 600 mg twice daily.  He tells me that he feels poor–fatigue is the predominant complaint.  He denies angina and has been tolerating prescribed pharmacotherapy.  He had a recent rise in creatinine after his nephrologist prescribed a low-dose of lisinopril–therapy was discontinued

## 2022-12-27 NOTE — ADDENDUM
[FreeTextEntry1] : ADDENDUM (12/27/22):\par \par Stable from cardiac standpoint for colonoscopy - he should continue all antihypertensive Rx but hold Eliquis for 3 days prior to procedure.\par

## 2022-12-27 NOTE — DISCUSSION/SUMMARY
[With Me] : with me [___ Month(s)] : in [unfilled] month(s) [FreeTextEntry1] : \par Hypertension:  Marked improvement in blood pressure–now reasonably well controlled; continue present antihypertensive regimen (hydralazine, diltiazem, labetalol).\par \par Atrial fibrillation: Paroxysmal  but with overall a low burden of arrhythmia; continue anticoagulation with Eliquis.  \par \par Chronic kidney disease: Recent worsening of creatinine; mild anemia (hemoglobin approximately 10);  he has been evaluated for transplant.

## 2023-01-17 ENCOUNTER — RX RENEWAL (OUTPATIENT)
Age: 58
End: 2023-01-17

## 2023-03-18 ENCOUNTER — RX RENEWAL (OUTPATIENT)
Age: 58
End: 2023-03-18

## 2023-03-29 ENCOUNTER — NON-APPOINTMENT (OUTPATIENT)
Age: 58
End: 2023-03-29

## 2023-03-29 ENCOUNTER — APPOINTMENT (OUTPATIENT)
Dept: CARDIOLOGY | Facility: CLINIC | Age: 58
End: 2023-03-29
Payer: COMMERCIAL

## 2023-03-29 VITALS
HEART RATE: 92 BPM | SYSTOLIC BLOOD PRESSURE: 120 MMHG | DIASTOLIC BLOOD PRESSURE: 76 MMHG | OXYGEN SATURATION: 99 % | HEIGHT: 67 IN | BODY MASS INDEX: 31.28 KG/M2 | WEIGHT: 199.29 LBS

## 2023-03-29 VITALS — DIASTOLIC BLOOD PRESSURE: 72 MMHG | SYSTOLIC BLOOD PRESSURE: 118 MMHG

## 2023-03-29 PROCEDURE — 93000 ELECTROCARDIOGRAM COMPLETE: CPT

## 2023-03-29 PROCEDURE — 99214 OFFICE O/P EST MOD 30 MIN: CPT | Mod: 25

## 2023-03-29 RX ORDER — PANTOPRAZOLE 40 MG/1
40 TABLET, DELAYED RELEASE ORAL DAILY
Qty: 30 | Refills: 0 | Status: ACTIVE | COMMUNITY

## 2023-06-04 ENCOUNTER — RX RENEWAL (OUTPATIENT)
Age: 58
End: 2023-06-04

## 2023-06-14 NOTE — DISCUSSION/SUMMARY
[With Me] : with me [___ Month(s)] : in [unfilled] month(s) [FreeTextEntry1] : \par Hypertension:  Satisfactory control; continue present doses of hydralazine, labetalol, and diltiazem.\par \par Atrial fibrillation:   Today's ECG demonstrates sinus rhythm.  Continue Eliquis.\par \par Chronic kidney disease: Recent worsening of Cr -- management  as per Dr. Mooney.\par \par  Diabetes mellitus: Suboptimal control with recent hemoglobin A1c approximately 7.5%.

## 2023-06-14 NOTE — PHYSICAL EXAM
[No Acute Distress] : no acute distress [Normal S1, S2] : normal S1, S2 [Clear Lung Fields] : clear lung fields [Normal Gait] : normal gait [No Murmur] : no murmur [Alert and Oriented] : alert and oriented [Appears Anxious] : appears anxious [de-identified] :   Overweight [de-identified] :  w [de-identified] :  regular [de-identified] :  warm and dry

## 2023-06-14 NOTE — CARDIOLOGY SUMMARY
[de-identified] : 3/29/23.  Sinus  Rhythm.  Early repolarization.  [de-identified] : 8/29/22.    Completed 8 minutes of the  protocol.  Hypertensive blood pressure response.  No ischemic ECG changes with exercise. [de-identified] : 7/28/2022.  Normal left ventricular systolic function with estimated ejection fraction 61%.  Mild diastolic dysfunction.  Moderate concentric LVH.  Normal right ventricular size and function.  Mild left atrial enlargement.  Normal estimated pulmonary artery pressure.

## 2023-06-14 NOTE — ADDENDUM
[FreeTextEntry1] : ADDENDUM (6/14/2023).\par \par Mr Colunga was doing well from a cardiac standpoint when I last saw him in March; no objection to upcoming elective eye procedure.  If necessary from an ophthalmologic standpoint, it would be reasonable to temporarily discontinue Eliquis prior to the procedure (2 to 3 days);  he should continue perioperative beta-blockade with labetalol.

## 2023-06-14 NOTE — REVIEW OF SYSTEMS
[Under Stress] : under stress [Feeling Fatigued] : feeling fatigued [Anxiety] : anxiety [SOB] : no shortness of breath [Chest Discomfort] : no chest discomfort

## 2023-06-14 NOTE — HISTORY OF PRESENT ILLNESS
[FreeTextEntry1] : Eber Colunga is a 57-year-old man with a history of severe hypertension, long standing diabetes mellitus, progressive chronic kidney disease (seeing Dr. Candido Mooney), obesity, and paroxysmal atrial fibrillation who returns for cardiac examination. \par \par He continues to do well from a cardiovascular standpoint but describes recent rise in creatinine and will be seeing his nephrologist at the end of this week.  He has not been experiencing palpitations and describes tolerance of prescribed pharmacotherapy (less fatigue since he changed dosing regimen of labetalol.

## 2023-07-02 ENCOUNTER — RX RENEWAL (OUTPATIENT)
Age: 58
End: 2023-07-02

## 2023-07-02 RX ORDER — LABETALOL HYDROCHLORIDE 300 MG/1
300 TABLET, FILM COATED ORAL
Qty: 270 | Refills: 0 | Status: ACTIVE | COMMUNITY
Start: 2022-12-23 | End: 1900-01-01

## 2023-07-05 ENCOUNTER — TRANSCRIPTION ENCOUNTER (OUTPATIENT)
Age: 58
End: 2023-07-05

## 2023-07-06 ENCOUNTER — OUTPATIENT (OUTPATIENT)
Dept: OUTPATIENT SERVICES | Facility: HOSPITAL | Age: 58
LOS: 1 days | End: 2023-07-06
Payer: COMMERCIAL

## 2023-07-06 VITALS
DIASTOLIC BLOOD PRESSURE: 67 MMHG | RESPIRATION RATE: 22 BRPM | SYSTOLIC BLOOD PRESSURE: 136 MMHG | WEIGHT: 196.21 LBS | HEIGHT: 68 IN | TEMPERATURE: 98 F | HEART RATE: 84 BPM | OXYGEN SATURATION: 97 %

## 2023-07-06 VITALS
RESPIRATION RATE: 18 BRPM | OXYGEN SATURATION: 98 % | DIASTOLIC BLOOD PRESSURE: 77 MMHG | SYSTOLIC BLOOD PRESSURE: 138 MMHG | HEART RATE: 83 BPM

## 2023-07-06 DIAGNOSIS — E11.3591 TYPE 2 DIABETES MELLITUS WITH PROLIFERATIVE DIABETIC RETINOPATHY WITHOUT MACULAR EDEMA, RIGHT EYE: ICD-10-CM

## 2023-07-06 DIAGNOSIS — E11.3511 TYPE 2 DIABETES MELLITUS WITH PROLIFERATIVE DIABETIC RETINOPATHY WITH MACULAR EDEMA, RIGHT EYE: ICD-10-CM

## 2023-07-06 DIAGNOSIS — H43.11 VITREOUS HEMORRHAGE, RIGHT EYE: ICD-10-CM

## 2023-07-06 LAB — GLUCOSE BLDC GLUCOMTR-MCNC: 185 MG/DL — HIGH (ref 70–99)

## 2023-07-06 PROCEDURE — 67039 LASER TREATMENT OF RETINA: CPT | Mod: AS,RT

## 2023-07-06 PROCEDURE — 67040 LASER TREATMENT OF RETINA: CPT | Mod: RT

## 2023-07-06 PROCEDURE — 82962 GLUCOSE BLOOD TEST: CPT

## 2023-07-06 PROCEDURE — C1889: CPT

## 2023-07-06 DEVICE — LASER PROBE 25G CONSTELLATION: Type: IMPLANTABLE DEVICE | Site: RIGHT | Status: FUNCTIONAL

## 2023-07-06 RX ORDER — EMPAGLIFLOZIN 10 MG/1
1 TABLET, FILM COATED ORAL
Qty: 0 | Refills: 0 | DISCHARGE

## 2023-07-06 RX ORDER — LABETALOL HCL 100 MG
1 TABLET ORAL
Refills: 0 | DISCHARGE

## 2023-07-06 RX ORDER — REPAGLINIDE 1 MG/1
1 TABLET ORAL
Refills: 0 | DISCHARGE

## 2023-07-06 RX ORDER — RNA INGREDIENT BNT-162B2 0.23 G/1.8ML
0.3 INJECTION, SUSPENSION INTRAMUSCULAR
Qty: 0 | Refills: 0 | DISCHARGE

## 2023-07-06 RX ORDER — HYDRALAZINE HCL 50 MG
1 TABLET ORAL
Refills: 0 | DISCHARGE

## 2023-07-06 RX ORDER — PANTOPRAZOLE SODIUM 20 MG/1
1 TABLET, DELAYED RELEASE ORAL
Refills: 0 | DISCHARGE

## 2023-07-06 RX ORDER — LABETALOL HCL 100 MG
2 TABLET ORAL
Qty: 0 | Refills: 0 | DISCHARGE

## 2023-07-06 RX ORDER — PARICALCITOL 2 UG/1
1 CAPSULE, GELATIN COATED ORAL
Refills: 0 | DISCHARGE

## 2023-07-06 RX ORDER — DILTIAZEM HCL 120 MG
1 CAPSULE, EXT RELEASE 24 HR ORAL
Refills: 0 | DISCHARGE

## 2023-07-06 RX ORDER — DULAGLUTIDE 4.5 MG/.5ML
4.5 INJECTION, SOLUTION SUBCUTANEOUS
Refills: 0 | DISCHARGE

## 2023-07-06 RX ORDER — ROSUVASTATIN CALCIUM 5 MG/1
1 TABLET ORAL
Refills: 0 | DISCHARGE

## 2023-07-06 NOTE — ASU DISCHARGE PLAN (ADULT/PEDIATRIC) - PROVIDER TOKENS
PROVIDER:[TOKEN:[[id number]]] FREE:[LAST:[Noel],FIRST:[Musa],PHONE:[phone]:[phone]],ADDRESS:[[address] [address]],SCHEDULEDAPPT:[07/07/2023],SCHEDULEDAPPTTIME:[10:00 AM]]

## 2023-07-06 NOTE — ASU DISCHARGE PLAN (ADULT/PEDIATRIC) - DISCHARGE TO
[facility]  Discharge Reassessment    Primary Care Provider: Dee Dee Oconnor MD    Expected Discharge Date: 6/11/2022    Reassessment (most recent)     Discharge Reassessment - 06/10/22 0942        Discharge Reassessment    Assessment Type Discharge Planning Reassessment     Did the patient's condition or plan change since previous assessment? No     Discharge Plan discussed with: Patient     Communicated GET with patient/caregiver Yes     Discharge Plan A Home Health     Discharge Plan B Home     DME Needed Upon Discharge  none     Discharge Barriers Identified None     Why the patient remains in the hospital Requires continued medical care        Post-Acute Status    Hospital Resources/Appts/Education Provided Post-Acute resouces added to AVS     Discharge Delays None known at this time                  Home

## 2023-07-06 NOTE — ASU DISCHARGE PLAN (ADULT/PEDIATRIC) - CARE PROVIDER_API CALL
Musa Cohen  Ophthalmology  [address] [place] [address], [facility] [address]  [place] [address]  Phone: [phone]  Fax: [phone]  Follow Up Time:    Musa Cohen  [address] [facility].  [facility] [address]  Phone: [phone]  Fax: [phone]  Scheduled Appointment: 07/07/2023 10:00 AM

## 2023-07-06 NOTE — ASU DISCHARGE PLAN (ADULT/PEDIATRIC) - ASU DC SPECIAL INSTRUCTIONSFT
Please keep eye shield on until seen in the office.   You may resume your medication regimen as usual.   Please follow up with your Doctors office for your appointment tomorrow.

## 2023-09-08 PROBLEM — I48.91 UNSPECIFIED ATRIAL FIBRILLATION: Chronic | Status: ACTIVE | Noted: 2023-07-06

## 2023-09-12 ENCOUNTER — APPOINTMENT (OUTPATIENT)
Dept: CARDIOLOGY | Facility: CLINIC | Age: 58
End: 2023-09-12
Payer: COMMERCIAL

## 2023-09-12 VITALS
HEIGHT: 67 IN | HEART RATE: 93 BPM | DIASTOLIC BLOOD PRESSURE: 68 MMHG | SYSTOLIC BLOOD PRESSURE: 114 MMHG | OXYGEN SATURATION: 97 %

## 2023-09-12 DIAGNOSIS — I48.0 PAROXYSMAL ATRIAL FIBRILLATION: ICD-10-CM

## 2023-09-12 DIAGNOSIS — Z01.818 ENCOUNTER FOR OTHER PREPROCEDURAL EXAMINATION: ICD-10-CM

## 2023-09-12 DIAGNOSIS — N18.9 CHRONIC KIDNEY DISEASE, UNSPECIFIED: ICD-10-CM

## 2023-09-12 PROCEDURE — 99214 OFFICE O/P EST MOD 30 MIN: CPT | Mod: 25

## 2023-09-12 PROCEDURE — 93000 ELECTROCARDIOGRAM COMPLETE: CPT

## 2023-09-12 RX ORDER — PARICALCITOL 1 UG/1
1 CAPSULE, LIQUID FILLED ORAL DAILY
Refills: 0 | Status: ACTIVE | COMMUNITY

## 2023-09-12 RX ORDER — HYDRALAZINE HYDROCHLORIDE 25 MG/1
25 TABLET ORAL 3 TIMES DAILY
Qty: 270 | Refills: 0 | Status: DISCONTINUED | COMMUNITY
Start: 2022-10-14 | End: 2023-09-12

## 2023-10-04 ENCOUNTER — APPOINTMENT (OUTPATIENT)
Dept: CARDIOLOGY | Facility: CLINIC | Age: 58
End: 2023-10-04

## 2024-01-10 ENCOUNTER — RX RENEWAL (OUTPATIENT)
Age: 59
End: 2024-01-10

## 2024-01-17 ENCOUNTER — EMERGENCY (EMERGENCY)
Facility: HOSPITAL | Age: 59
LOS: 0 days | Discharge: LEFT BEFORE DISCHARGE | End: 2024-01-17
Attending: EMERGENCY MEDICINE
Payer: COMMERCIAL

## 2024-01-17 VITALS
HEIGHT: 68 IN | HEART RATE: 96 BPM | RESPIRATION RATE: 18 BRPM | SYSTOLIC BLOOD PRESSURE: 123 MMHG | DIASTOLIC BLOOD PRESSURE: 67 MMHG | WEIGHT: 184.97 LBS | TEMPERATURE: 98 F | OXYGEN SATURATION: 100 %

## 2024-01-17 VITALS — SYSTOLIC BLOOD PRESSURE: 80 MMHG | DIASTOLIC BLOOD PRESSURE: 50 MMHG

## 2024-01-17 DIAGNOSIS — I95.1 ORTHOSTATIC HYPOTENSION: ICD-10-CM

## 2024-01-17 DIAGNOSIS — Z53.29 PROCEDURE AND TREATMENT NOT CARRIED OUT BECAUSE OF PATIENT'S DECISION FOR OTHER REASONS: ICD-10-CM

## 2024-01-17 DIAGNOSIS — E11.22 TYPE 2 DIABETES MELLITUS WITH DIABETIC CHRONIC KIDNEY DISEASE: ICD-10-CM

## 2024-01-17 DIAGNOSIS — I95.9 HYPOTENSION, UNSPECIFIED: ICD-10-CM

## 2024-01-17 DIAGNOSIS — Z99.2 DEPENDENCE ON RENAL DIALYSIS: ICD-10-CM

## 2024-01-17 DIAGNOSIS — N18.6 END STAGE RENAL DISEASE: ICD-10-CM

## 2024-01-17 DIAGNOSIS — I48.91 UNSPECIFIED ATRIAL FIBRILLATION: ICD-10-CM

## 2024-01-17 DIAGNOSIS — I12.0 HYPERTENSIVE CHRONIC KIDNEY DISEASE WITH STAGE 5 CHRONIC KIDNEY DISEASE OR END STAGE RENAL DISEASE: ICD-10-CM

## 2024-01-17 PROCEDURE — 99283 EMERGENCY DEPT VISIT LOW MDM: CPT

## 2024-01-17 PROCEDURE — 99284 EMERGENCY DEPT VISIT MOD MDM: CPT

## 2024-01-17 NOTE — ED PROVIDER NOTE - CLINICAL SUMMARY MEDICAL DECISION MAKING FREE TEXT BOX
59 y/o M with hypotension s/p dialysis hx of orthostatic hypotension, but undergoing workup with cardiologist for dyspnea on exertion. Awaiting outpatient CT to r/o amyloidosis. Labs, repeat bp in ED planned.

## 2024-01-17 NOTE — ED ADULT TRIAGE NOTE - CHIEF COMPLAINT QUOTE
Pt BIBEMS from dialysis, sent to ED for orthostatic hypotension after dialysis session (60's systolic). Pt states he normally has a low pressure after dialysis. Normotensive in triage. Denies chest pain, HA, and dizziness.

## 2024-01-17 NOTE — ED ADULT NURSE NOTE - OBJECTIVE STATEMENT
Pt is 57yo M, A&ox3 who presents to ED with c/o hypotension. Pt is 59yo M, A&ox3 who presents to ED with c/o hypotension. pt reports being sent from Dialysis when he stood up and his BP dropped. pt states "when I stand or walk a few feet I get dizzy and my blood pressure drops. This has been going onfor the last 2 years and have not figured out why." pt reprots when sitting and lying down he does ot feel dizzy and "feels fine." pt sent to ED for further medical evaluation.   Denies chest pain, SOB, dizziness, n/v/d, weakness  L fistula noted.

## 2024-01-17 NOTE — ED PROVIDER NOTE - NSFOLLOWUPINSTRUCTIONS_ED_ALL_ED_FT
PLEASE FOLLOW UP WITH YOUR DOCTOR WITHIN 24 HOURS.  YOU SIGNED OUT AGAINST MEDICAL ADVICE.  IF YOU HAVE RETURN OR WORSENING SYMPTOMS PLEASE RETURN TO THE ER.    English    Orthostatic Hypotension  Blood pressure is a measurement of how strongly, or weakly, your circulating blood is pressing against the walls of your arteries. Orthostatic hypotension is a drop in blood pressure that can happen when you change positions, such as when you go from lying down to standing.    Arteries are blood vessels that carry blood from your heart throughout your body. When blood pressure is too low, you may not get enough blood to your brain or to the rest of your organs. Orthostatic hypotension can cause light-headedness, sweating, rapid heartbeat, blurred vision, and fainting. These symptoms require further investigation into the cause.    What are the causes?  Orthostatic hypotension can be caused by many things, including:  Sudden changes in posture, such as standing up quickly after you have been sitting or lying down.  Loss of blood (anemia) or loss of body fluids (dehydration).  Heart problems, neurologic problems, or hormone problems.  Pregnancy.  Aging. The risk for this condition increases as you get older.  Severe infection (sepsis).  Certain medicines, such as medicines for high blood pressure or medicines that make the body lose excess fluids (diuretics).  What are the signs or symptoms?  Symptoms of this condition may include:  Weakness, light-headedness, or dizziness.  Sweating.  Blurred vision.  Tiredness (fatigue).  Rapid heartbeat.  Fainting, in severe cases.  How is this diagnosed?  This condition is diagnosed based on:  Your symptoms and medical history.  Your blood pressure measurements. Your health care provider will check your blood pressure when you are:  Lying down.  Sitting.  Standing.  A blood pressure reading is recorded as two numbers, such as "120 over 80" (or 120/80). The first ("top") number is called the systolic pressure. It is a measure of the pressure in your arteries as your heart beats. The second ("bottom") number is called the diastolic pressure. It is a measure of the pressure in your arteries when your heart relaxes between beats. Blood pressure is measured in a unit called mmHg. Healthy blood pressure for most adults is 120/80 mmHg. Orthostatic hypotension is defined as a 20 mmHg drop in systolic pressure or a 10 mmHg drop in diastolic pressure within 3 minutes of standing.    Other information or tests that may be used to diagnose orthostatic hypotension include:  Your other vital signs, such as your heart rate and temperature.  Blood tests.  An electrocardiogram (ECG) or echocardiogram.  A Holter monitor. This is a device you wear that records your heart rhythm continuously, usually for 24–48 hours.  Tilt table test. For this test, you will be safely secured to a table that moves you from a lying position to an upright position. Your heart rhythm and blood pressure will be monitored during the test.  How is this treated?  This condition may be treated by:  Changing your diet. This may involve eating more salt (sodium) or drinking more water.  Changing the dosage of certain medicines you are taking that might be lowering your blood pressure.  Correcting the underlying reason for the orthostatic hypotension.  Wearing compression stockings.  Taking medicines to raise your blood pressure.  Avoiding actions that trigger symptoms.  Follow these instructions at home:  Medicines    Take over-the-counter and prescription medicines only as told by your health care provider.  Follow instructions from your health care provider about changing the dosage of your current medicines, if this applies.  Do not stop or adjust any of your medicines on your own.  Eating and drinking    Illustration of a person drinking a glass of water.  Drink enough fluid to keep your urine pale yellow.  Eat extra salt only as directed. Do not add extra salt to your diet unless advised by your health care provider.  Eat frequent, small meals.  Avoid standing up suddenly after eating.  General instructions    Compression stockings on a person's lower legs.  Get up slowly from lying down or sitting positions. This gives your blood pressure a chance to adjust.  Avoid hot showers and excessive heat as directed by your health care provider.  Engage in regular physical activity as directed by your health care provider.  If you have compression stockings, wear them as told.  Keep all follow-up visits. This is important.  Contact a health care provider if:  You have a fever for more than 2–3 days.  You feel more thirsty than usual.  You feel dizzy or weak.  Get help right away if:  You have chest pain.  You have a fast or irregular heartbeat.  You become sweaty or feel light-headed.  You feel short of breath.  You faint.  You have any symptoms of a stroke. "BE FAST" is an easy way to remember the main warning signs of a stroke:  B - Balance. Signs are dizziness, sudden trouble walking, or loss of balance.  E - Eyes. Signs are trouble seeing or a sudden change in vision.  F - Face. Signs are sudden weakness or numbness of the face, or the face or eyelid drooping on one side.  A - Arms. Signs are weakness or numbness in an arm. This happens suddenly and usually on one side of the body.  S - Speech. Signs are sudden trouble speaking, slurred speech, or trouble understanding what people say.  T - Time. Time to call emergency services. Write down what time symptoms started.  You have other signs of a stroke, such as:  A sudden, severe headache with no known cause.  Nausea or vomiting.  Seizure.  These symptoms may represent a serious problem that is an emergency. Do not wait to see if the symptoms will go away. Get medical help right away. Call your local emergency services (911 in the [place] Do not drive yourself to the hospital.    Summary  Orthostatic hypotension is a sudden drop in blood pressure.  It can cause light-headedness, sweating, rapid heartbeat, blurred vision, and fainting.  Orthostatic hypotension can be diagnosed by having your blood pressure taken while lying down, sitting, and then standing.  Treatment may involve changing your diet, wearing compression stockings, sitting up slowly, adjusting your medicines, or correcting the underlying reason for the orthostatic hypotension.  Get help right away if you have chest pain, a fast or irregular heartbeat, or symptoms of a stroke.  This information is not intended to replace advice given to you by your health care provider. Make sure you discuss any questions you have with your health care provider.    Document Revised: 03/03/2022 Document Reviewed: 03/03/2022  [employer] Patient Education © 2023 [employer]  [employer] logo  Terms and Conditions  Privacy Policy  Editorial Policy  All content on this site: Copyright © 2024 [employer], its licensors, and contributors. All rights are reserved, including those for text and data mining, AI training, and similar technologies. For all open access content, the Creative Commons licensing terms apply.  Cookies are used by this site. To decline or learn more, visit our Cookies page.  [employer]

## 2024-01-17 NOTE — ED ADULT NURSE REASSESSMENT NOTE - NS ED NURSE REASSESS COMMENT FT1
Pt refused blood work, IV insert and further evaluation at this time. pt states "I wanna leave." MD Mcgill aware and notified at this time.

## 2024-01-17 NOTE — ED ADULT NURSE NOTE - NSFALLHARMRISKINTERV_ED_ALL_ED

## 2024-01-17 NOTE — ED PROVIDER NOTE - PROGRESS NOTE DETAILS
Leopoldo Middleton for Dr. Mcgill  Orthostatic vitals done, systolic 80 when standing. IV fluids, labs recommended. Pt does not want testing and wants to go home. Pt says at dialysis he is able to go home once systolic is 80. Pt asked to sign AMA.

## 2024-01-17 NOTE — ED PROVIDER NOTE - CONSTITUTIONAL, MLM
normal... Well appearing, awake, alert, oriented to person, place, time/situation and in no apparent distress. speaking full sentences

## 2024-01-17 NOTE — ED PROVIDER NOTE - OBJECTIVE STATEMENT
57 y/o M with PMHx of afib in 2020 but not currently, DM2, HTN presents to the ED BIBEMS from dialysis, sent to ED for orthostatic hypotension after dialysis session (60's systolic). Pt states he normally has a low pressure after dialysis. Normotensive in triage. Denies cp, HA, and dizziness. Pt says today it was the worst his bp ever been after dialysis. 2.2L took off pt today, but got about 1L back. Pt says he stayed over an hour after dialysis with no improvement in bp, so he was sent to ED. Pt on dialysis since Sept. for less than 50% in both kidneys due to DM. cardiologist MD Acevedo.

## 2024-01-17 NOTE — ED PROVIDER NOTE - PATIENT PORTAL LINK FT
You can access the FollowMyHealth Patient Portal offered by [facility] by registering at the following website: [email or web address]. By joining [facility]’s FollowMyHealth portal, you will also be able to view your health information using other applications (apps) compatible with our system.

## 2024-05-05 ENCOUNTER — RX RENEWAL (OUTPATIENT)
Age: 59
End: 2024-05-05

## 2024-05-05 DIAGNOSIS — I10 ESSENTIAL (PRIMARY) HYPERTENSION: ICD-10-CM

## 2024-05-05 DIAGNOSIS — E78.5 HYPERLIPIDEMIA, UNSPECIFIED: ICD-10-CM

## 2024-05-05 DIAGNOSIS — E11.9 TYPE 2 DIABETES MELLITUS W/OUT COMPLICATIONS: ICD-10-CM

## 2024-05-07 RX ORDER — DILTIAZEM HYDROCHLORIDE 360 MG/1
360 CAPSULE, COATED, EXTENDED RELEASE ORAL
Qty: 30 | Refills: 0 | Status: ACTIVE | COMMUNITY
Start: 2023-03-18 | End: 1900-01-01

## 2024-08-10 NOTE — CARDIOLOGY SUMMARY
BUN/Cr ratio 102/11.5 (baseline Cr 6)  Based on current GFR, CKD stage is stage 4 - GFR 15-29.      Plan  -Nephrology following appreciate their recommendations  -Continue dialysis  -Renally dose meds.   -Avoid nephrotoxic medications and procedures.  -To restart the losartan 8/11/24   [de-identified] : 7/14/2022.    Normal sinus rhythm.  Poor precordial R wave progression. [de-identified] : 8/29/22.    Completed 8 minutes of the  protocol.  Hypertensive blood pressure response.  No ischemic ECG changes with exercise. [de-identified] : 7/28/2022.  Normal left ventricular systolic function with estimated ejection fraction 61%.  Mild diastolic dysfunction.  Moderate concentric LVH.  Normal right ventricular size and function.  Mild left atrial enlargement.  Normal estimated pulmonary artery pressure.

## 2024-09-04 ENCOUNTER — OFFICE (OUTPATIENT)
Dept: URBAN - METROPOLITAN AREA CLINIC 70 | Facility: CLINIC | Age: 59
Setting detail: OPHTHALMOLOGY
End: 2024-09-04
Payer: COMMERCIAL

## 2024-09-04 DIAGNOSIS — E11.3292: ICD-10-CM

## 2024-09-04 DIAGNOSIS — H25.11: ICD-10-CM

## 2024-09-04 DIAGNOSIS — E11.3311: ICD-10-CM

## 2024-09-04 DIAGNOSIS — H43.813: ICD-10-CM

## 2024-09-04 DIAGNOSIS — H25.13: ICD-10-CM

## 2024-09-04 PROCEDURE — 92004 COMPRE OPH EXAM NEW PT 1/>: CPT | Performed by: OPHTHALMOLOGY

## 2024-09-04 PROCEDURE — 92136 OPHTHALMIC BIOMETRY: CPT | Performed by: OPHTHALMOLOGY

## 2024-09-04 PROCEDURE — 92134 CPTRZ OPH DX IMG PST SGM RTA: CPT | Performed by: OPHTHALMOLOGY

## 2024-09-04 ASSESSMENT — CONFRONTATIONAL VISUAL FIELD TEST (CVF)
OS_FINDINGS: FULL
OD_FINDINGS: FULL

## 2024-09-27 ENCOUNTER — RX ONLY (RX ONLY)
Age: 59
End: 2024-09-27

## 2024-09-27 ENCOUNTER — ASC (OUTPATIENT)
Dept: URBAN - METROPOLITAN AREA SURGERY 8 | Facility: SURGERY | Age: 59
Setting detail: OPHTHALMOLOGY
End: 2024-09-27
Payer: COMMERCIAL

## 2024-09-27 DIAGNOSIS — H25.11: ICD-10-CM

## 2024-09-27 PROCEDURE — 66984 XCAPSL CTRC RMVL W/O ECP: CPT | Mod: RT | Performed by: OPHTHALMOLOGY

## 2024-09-28 ENCOUNTER — OFFICE (OUTPATIENT)
Dept: URBAN - METROPOLITAN AREA CLINIC 104 | Facility: CLINIC | Age: 59
Setting detail: OPHTHALMOLOGY
End: 2024-09-28
Payer: COMMERCIAL

## 2024-09-28 DIAGNOSIS — Z96.1: ICD-10-CM

## 2024-09-28 PROBLEM — H25.812 CATARACT SENILE NUCLEAR SCLEROSIS;  , LEFT EYE: Status: ACTIVE | Noted: 2024-09-28

## 2024-09-28 PROBLEM — H43.813 POSTERIOR VITREOUS DETACHMENT; BOTH EYES: Status: ACTIVE | Noted: 2024-09-04

## 2024-09-28 PROBLEM — H25.12 CATARACT SENILE NUCLEAR SCLEROSIS;  , LEFT EYE: Status: ACTIVE | Noted: 2024-09-28

## 2024-09-28 PROBLEM — E11.3292 DM TYPE 2; RIGHT MOD WITH ME, LEFT MILD WITHOUT ME: Status: ACTIVE | Noted: 2024-09-04

## 2024-09-28 PROBLEM — E11.3311 DM TYPE 2; RIGHT MOD WITH ME, LEFT MILD WITHOUT ME: Status: ACTIVE | Noted: 2024-09-04

## 2024-09-28 PROCEDURE — 99024 POSTOP FOLLOW-UP VISIT: CPT | Performed by: OPTOMETRIST

## 2024-09-28 ASSESSMENT — CONFRONTATIONAL VISUAL FIELD TEST (CVF)
OS_FINDINGS: FULL
OD_FINDINGS: FULL

## 2024-10-03 ENCOUNTER — OFFICE (OUTPATIENT)
Dept: URBAN - METROPOLITAN AREA CLINIC 104 | Facility: CLINIC | Age: 59
Setting detail: OPHTHALMOLOGY
End: 2024-10-03
Payer: COMMERCIAL

## 2024-10-03 DIAGNOSIS — H25.12: ICD-10-CM

## 2024-10-03 DIAGNOSIS — Z96.1: ICD-10-CM

## 2024-10-03 PROCEDURE — 99024 POSTOP FOLLOW-UP VISIT: CPT | Performed by: OPTOMETRIST

## 2024-10-03 PROCEDURE — 99024 POSTOP FOLLOW-UP VISIT: CPT | Performed by: OPHTHALMOLOGY

## 2024-10-03 PROCEDURE — 92136 OPHTHALMIC BIOMETRY: CPT | Performed by: OPHTHALMOLOGY

## 2024-10-03 ASSESSMENT — KERATOMETRY
OD_K2POWER_DIOPTERS: 45.30
OD_K2POWER_DIOPTERS: 45.30
OD_AXISANGLE_DEGREES: 87
OD_K1POWER_DIOPTERS: 44.76
OD_AXISANGLE_DEGREES: 87
OD_K1POWER_DIOPTERS: 44.76

## 2024-10-03 ASSESSMENT — CORNEAL EDEMA CLINICAL DESCRIPTION
OD_CORNEALEDEMA: ABSENT
OD_CORNEALEDEMA: ABSENT

## 2024-10-03 ASSESSMENT — VISUAL ACUITY
OS_BCVA: 20/25
OS_BCVA: 20/25
OD_BCVA: 20/60
OD_BCVA: 20/60

## 2024-10-03 ASSESSMENT — CONFRONTATIONAL VISUAL FIELD TEST (CVF)
OD_FINDINGS: FULL
OS_FINDINGS: FULL

## 2024-10-03 ASSESSMENT — TONOMETRY: OD_IOP_MMHG: 16

## 2024-10-03 ASSESSMENT — REFRACTION_AUTOREFRACTION
OD_SPHERE: +0.50
OD_SPHERE: +0.50
OD_AXIS: 150
OD_CYLINDER: -0.25
OD_CYLINDER: -0.25
OD_AXIS: 150

## 2024-10-16 ENCOUNTER — ASC (OUTPATIENT)
Dept: URBAN - METROPOLITAN AREA SURGERY 8 | Facility: SURGERY | Age: 59
Setting detail: OPHTHALMOLOGY
End: 2024-10-16
Payer: COMMERCIAL

## 2024-10-16 DIAGNOSIS — H25.12: ICD-10-CM

## 2024-10-16 PROCEDURE — 66984 XCAPSL CTRC RMVL W/O ECP: CPT | Mod: 79,LT | Performed by: OPHTHALMOLOGY

## 2024-10-17 ENCOUNTER — OFFICE (OUTPATIENT)
Dept: URBAN - METROPOLITAN AREA CLINIC 104 | Facility: CLINIC | Age: 59
Setting detail: OPHTHALMOLOGY
End: 2024-10-17
Payer: COMMERCIAL

## 2024-10-17 ENCOUNTER — RX ONLY (RX ONLY)
Age: 59
End: 2024-10-17

## 2024-10-17 DIAGNOSIS — Z96.1: ICD-10-CM

## 2024-10-17 PROCEDURE — 99024 POSTOP FOLLOW-UP VISIT: CPT | Performed by: OPTOMETRIST

## 2024-10-17 ASSESSMENT — REFRACTION_AUTOREFRACTION
OD_CYLINDER: -0.50
OD_AXIS: 145
OD_SPHERE: +0.75

## 2024-10-17 ASSESSMENT — VISUAL ACUITY
OD_BCVA: 20/30+1
OS_BCVA: 20/25

## 2024-10-17 ASSESSMENT — KERATOMETRY
OD_K1POWER_DIOPTERS: 44.25
OD_K2POWER_DIOPTERS: 45.00
OD_AXISANGLE_DEGREES: 77

## 2024-10-17 ASSESSMENT — TONOMETRY: OS_IOP_MMHG: 14

## 2024-10-17 ASSESSMENT — CORNEAL EDEMA CLINICAL DESCRIPTION: OD_CORNEALEDEMA: ABSENT

## 2024-10-21 ENCOUNTER — OFFICE (OUTPATIENT)
Dept: URBAN - METROPOLITAN AREA CLINIC 70 | Facility: CLINIC | Age: 59
Setting detail: OPHTHALMOLOGY
End: 2024-10-21
Payer: COMMERCIAL

## 2024-10-21 DIAGNOSIS — Z96.1: ICD-10-CM

## 2024-10-21 PROCEDURE — 99024 POSTOP FOLLOW-UP VISIT: CPT | Performed by: OPTOMETRIST

## 2024-10-21 ASSESSMENT — KERATOMETRY
OS_K1POWER_DIOPTERS: 44.25
OD_K1POWER_DIOPTERS: 44.25
OS_K2POWER_DIOPTERS: 45.25
OD_K2POWER_DIOPTERS: 45.50
OS_AXISANGLE_DEGREES: 087
OD_AXISANGLE_DEGREES: 081

## 2024-10-21 ASSESSMENT — REFRACTION_AUTOREFRACTION
OD_SPHERE: +0.75
OD_AXIS: 159
OS_AXIS: 088
OS_SPHERE: +0.75
OS_CYLINDER: -0.50
OD_CYLINDER: -0.75

## 2024-10-21 ASSESSMENT — CONFRONTATIONAL VISUAL FIELD TEST (CVF)
OS_FINDINGS: FULL
OD_FINDINGS: FULL

## 2024-10-21 ASSESSMENT — VISUAL ACUITY
OD_BCVA: 20/30+2
OS_BCVA: 20/25+2

## 2024-10-21 ASSESSMENT — CORNEAL EDEMA CLINICAL DESCRIPTION: OD_CORNEALEDEMA: ABSENT

## 2024-11-11 ENCOUNTER — OFFICE (OUTPATIENT)
Dept: URBAN - METROPOLITAN AREA CLINIC 70 | Facility: CLINIC | Age: 59
Setting detail: OPHTHALMOLOGY
End: 2024-11-11
Payer: COMMERCIAL

## 2024-11-11 DIAGNOSIS — Z96.1: ICD-10-CM

## 2024-11-11 PROCEDURE — 99024 POSTOP FOLLOW-UP VISIT: CPT | Performed by: OPTOMETRIST

## 2024-11-11 ASSESSMENT — KERATOMETRY
OD_K2POWER_DIOPTERS: 45.00
OS_AXISANGLE_DEGREES: 099
OD_AXISANGLE_DEGREES: 076
OS_K1POWER_DIOPTERS: 44.25
OS_K2POWER_DIOPTERS: 45.00
OD_K1POWER_DIOPTERS: 44.25

## 2024-11-11 ASSESSMENT — CONFRONTATIONAL VISUAL FIELD TEST (CVF)
OD_FINDINGS: FULL
OS_FINDINGS: FULL

## 2024-11-11 ASSESSMENT — CORNEAL EDEMA CLINICAL DESCRIPTION: OD_CORNEALEDEMA: ABSENT

## 2024-11-11 ASSESSMENT — REFRACTION_AUTOREFRACTION
OD_CYLINDER: -0.50
OS_CYLINDER: -0.25
OD_SPHERE: +0.75
OS_AXIS: 023
OD_AXIS: 146
OS_SPHERE: +0.50

## 2024-11-11 ASSESSMENT — VISUAL ACUITY
OS_BCVA: 20/20-1
OD_BCVA: 20/30+2

## 2025-02-13 ENCOUNTER — OFFICE (OUTPATIENT)
Dept: URBAN - METROPOLITAN AREA CLINIC 70 | Facility: CLINIC | Age: 60
Setting detail: OPHTHALMOLOGY
End: 2025-02-13
Payer: COMMERCIAL

## 2025-02-13 DIAGNOSIS — E11.3292: ICD-10-CM

## 2025-02-13 DIAGNOSIS — E11.3311: ICD-10-CM

## 2025-02-13 DIAGNOSIS — Z96.1: ICD-10-CM

## 2025-02-13 DIAGNOSIS — H43.813: ICD-10-CM

## 2025-02-13 PROCEDURE — 92250 FUNDUS PHOTOGRAPHY W/I&R: CPT | Performed by: OPTOMETRIST

## 2025-02-13 PROCEDURE — 99213 OFFICE O/P EST LOW 20 MIN: CPT | Performed by: OPTOMETRIST

## 2025-02-13 ASSESSMENT — CORNEAL EDEMA CLINICAL DESCRIPTION: OD_CORNEALEDEMA: ABSENT

## 2025-02-13 ASSESSMENT — VISUAL ACUITY
OD_BCVA: 20/30+2
OS_BCVA: 20/20-1

## 2025-02-13 ASSESSMENT — KERATOMETRY
OD_AXISANGLE_DEGREES: 077
OS_AXISANGLE_DEGREES: 100
OS_K2POWER_DIOPTERS: 45.00
OD_K1POWER_DIOPTERS: 44.25
OD_K2POWER_DIOPTERS: 45.50
OS_K1POWER_DIOPTERS: 44.25

## 2025-02-13 ASSESSMENT — REFRACTION_AUTOREFRACTION
OS_AXIS: 078
OS_CYLINDER: -0.25
OD_CYLINDER: -1.00
OD_AXIS: 149
OD_SPHERE: +0.75
OS_SPHERE: +0.50

## 2025-02-13 ASSESSMENT — CONFRONTATIONAL VISUAL FIELD TEST (CVF)
OS_FINDINGS: FULL
OD_FINDINGS: FULL

## (undated) DEVICE — GLV 7 PROTEXIS (WHITE)

## (undated) DEVICE — PACK VITRECTOMY

## (undated) DEVICE — CONSTELLATION TOTAL PLUS PAK 23G

## (undated) DEVICE — WARMING BLANKET LOWER ADULT

## (undated) DEVICE — ELCTR BIPOLAR CORD J&J 12FT DISP

## (undated) DEVICE — ILM RESOLUTION FORCEP 25G DISP

## (undated) DEVICE — DRAPE STERI-DRAPE INCISE 13X13"

## (undated) DEVICE — DIATHERMY PROBE 25GA

## (undated) DEVICE — SOL IRR BAL SALT + 500ML

## (undated) DEVICE — LIGHT SHIELD CORNEAL

## (undated) DEVICE — SUT VICRYL 7-0 12" TG140-8 DA

## (undated) DEVICE — CANNULA MEDONE FLEXTIP 25G

## (undated) DEVICE — NDL HYPO SAFE 22G X 1.5" (BLACK)

## (undated) DEVICE — DRSG MASTISOL

## (undated) DEVICE — VENODYNE/SCD SLEEVE CALF MEDIUM